# Patient Record
Sex: FEMALE | Race: WHITE | NOT HISPANIC OR LATINO | Employment: OTHER | URBAN - METROPOLITAN AREA
[De-identification: names, ages, dates, MRNs, and addresses within clinical notes are randomized per-mention and may not be internally consistent; named-entity substitution may affect disease eponyms.]

---

## 2020-11-30 ENCOUNTER — HOSPITAL ENCOUNTER (EMERGENCY)
Facility: HOSPITAL | Age: 85
Discharge: HOME | End: 2020-11-30
Attending: EMERGENCY MEDICINE
Payer: MEDICARE

## 2020-11-30 VITALS
BODY MASS INDEX: 28.47 KG/M2 | HEIGHT: 60 IN | OXYGEN SATURATION: 97 % | DIASTOLIC BLOOD PRESSURE: 88 MMHG | TEMPERATURE: 98 F | WEIGHT: 145 LBS | RESPIRATION RATE: 20 BRPM | HEART RATE: 98 BPM | SYSTOLIC BLOOD PRESSURE: 157 MMHG

## 2020-11-30 DIAGNOSIS — M54.2 CHRONIC NECK PAIN: Primary | ICD-10-CM

## 2020-11-30 DIAGNOSIS — G89.29 CHRONIC NECK PAIN: Primary | ICD-10-CM

## 2020-11-30 DIAGNOSIS — S16.1XXA STRAIN OF NECK MUSCLE, INITIAL ENCOUNTER: ICD-10-CM

## 2020-11-30 PROCEDURE — 99284 EMERGENCY DEPT VISIT MOD MDM: CPT

## 2020-11-30 RX ORDER — KETOROLAC TROMETHAMINE 10 MG/1
10 TABLET, FILM COATED ORAL EVERY 8 HOURS PRN
Qty: 20 TABLET | Refills: 0 | Status: ON HOLD | OUTPATIENT
Start: 2020-11-30 | End: 2020-12-14 | Stop reason: HOSPADM

## 2020-11-30 RX ORDER — METHOCARBAMOL 500 MG/1
500 TABLET, FILM COATED ORAL 3 TIMES DAILY PRN
Qty: 20 TABLET | Refills: 0 | Status: ON HOLD | OUTPATIENT
Start: 2020-11-30 | End: 2020-12-14 | Stop reason: HOSPADM

## 2020-11-30 NOTE — ED PROVIDER NOTES
Encounter Date: 11/30/2020       History     Chief Complaint   Patient presents with    Neck Pain     Patient complaining of neck pain x6 months.     84-year-old female presents to ER for concerns of intermittent right neck pain x6 months; pain worsens in the morning upon awakening & exacerbates with direct contact, denies significant alleviating factors, OTC medications taken with minimal improvement, symptoms waxing/waning since onset described as moderate currently    Denies:  fever, headache, dizziness, syncope, vision changes, painful/difficult swallowing, chest pain, shortness breath, cough, abdominal pain, nausea/vomiting/diarrhea, hematuria/dysuria    No previous evaluation has been performed nor has PCP been contacted for today's concerns    Past medical/surgical history, allergies & current medications reviewed with patient    Known SARS-CoV2 exposure:  No  Room:  PIT    The history is provided by the patient. No  was used.     Review of patient's allergies indicates:  No Known Allergies  Past Medical History:   Diagnosis Date    Overactive bladder      History reviewed. No pertinent surgical history.  History reviewed. No pertinent family history.  Social History     Tobacco Use    Smoking status: Never Smoker   Substance Use Topics    Alcohol use: Never     Frequency: Never    Drug use: Never     Review of Systems   Constitutional: Negative for fever.   HENT: Negative for sore throat.    Respiratory: Negative for cough and shortness of breath.    Cardiovascular: Negative for chest pain.   Gastrointestinal: Negative for abdominal pain and nausea.   Genitourinary: Negative for dysuria.   Musculoskeletal: Positive for neck pain. Negative for back pain and gait problem.   Skin: Negative for rash.   Neurological: Negative for weakness and headaches.   Hematological: Does not bruise/bleed easily.   All other systems reviewed and are negative.      Physical Exam     Initial Vitals  [11/30/20 1509]   BP Pulse Resp Temp SpO2   (!) 157/88 98 20 98.1 °F (36.7 °C) 97 %      MAP       --         Physical Exam    Nursing note and vitals reviewed.  Constitutional: She appears well-developed. She does not appear ill. No distress.   AF, VSS   HENT:   Head: Normocephalic and atraumatic.   Right Ear: External ear normal.   Left Ear: External ear normal.   Nose: Nose normal.   Eyes: Lids are normal.   Neck: Trachea normal. Neck supple. No thyromegaly present. Muscular tenderness (moderate & reproducible to right paraspinal muscles, exam is unimpressive otherwise) present.   Patient is able to turn her head left and right 45° without pain, denies pain with palpation of C-spine, no crepitus or step-offs appreciated   Cardiovascular: Normal rate.   Pulses:       Carotid pulses are 2+ on the right side and 2+ on the left side.  Pulmonary/Chest: Effort normal and breath sounds normal. No respiratory distress.   Abdominal: She exhibits no distension.   Lymphadenopathy:     She has no cervical adenopathy.   Neurological: She is alert.   Skin: No rash noted.   Psychiatric: She has a normal mood and affect.         ED Course   Procedures  Labs Reviewed - No data to display       Imaging Results    None          Medical Decision Making:   ED Management:  Findings, diagnosis & plan of care discussed with patient:  Chronic neck pain, neck strain; will discharge patient with Robaxin Toradol, care instructions given -- patient states she does not currently have a PCP therefore we will refer patient to [facility] for follow-up and to establish new patient-provider relationship    All questions answered, strict return precautions given, patient agrees with plan of care & verbalizes understanding to all instructions, pleasant visit -- vital signs are stable & patient is in no distress at discharge    Disclaimer:  This note was prepared with [employer] voice recognition transcription software.  Garbled syntax, mangled pronouns, and other bizarre constructions may be attributed to that software system.  Should there be any questions do not hesitate to contact me for clarification.                               Clinical Impression:       ICD-10-CM ICD-9-CM   1. Chronic neck pain  M54.2 723.1    G89.29 338.29   2. Strain of neck muscle, initial encounter  S16.1XXA 847.0                          ED Disposition Condition    Discharge Stable        ED Prescriptions     Medication Sig Dispense Start Date End Date Auth. Provider    methocarbamoL (ROBAXIN) 500 MG Tab Take 1 tablet (500 mg total) by mouth 3 (three) times daily as needed (Pain / strain). 20 tablet 11/30/2020  Lb Vang NP    ketorolac (TORADOL) 10 mg tablet Take 1 tablet (10 mg total) by mouth every 8 (eight) hours as needed for Pain. 20 tablet 11/30/2020  Lb Vang NP        Follow-up Information     Follow up With Specialties Details Why Contact Info    [facility]  Go to  When scheduled for follow up and establish patient-provider relationship                                        Lb Vang NP  11/30/20 0399

## 2020-12-09 ENCOUNTER — HOSPITAL ENCOUNTER (INPATIENT)
Facility: HOSPITAL | Age: 85
LOS: 6 days | Discharge: NURSING, REHAB OR LONG-TERM CARE | DRG: 840 | End: 2020-12-15
Attending: INTERNAL MEDICINE | Admitting: INTERNAL MEDICINE
Payer: MEDICARE

## 2020-12-09 DIAGNOSIS — C90.00 MULTIPLE MYELOMA: ICD-10-CM

## 2020-12-09 DIAGNOSIS — I82.409 DVT (DEEP VENOUS THROMBOSIS): ICD-10-CM

## 2020-12-09 DIAGNOSIS — Z12.89 ENCOUNTER FOR SCREENING FOR MALIGNANT NEOPLASM OF OTHER SITES: ICD-10-CM

## 2020-12-09 PROBLEM — N39.0 UTI (URINARY TRACT INFECTION): Status: ACTIVE | Noted: 2020-12-09

## 2020-12-09 PROBLEM — J18.9 PNEUMONIA: Status: ACTIVE | Noted: 2020-12-09

## 2020-12-09 PROBLEM — E83.52 HYPERCALCEMIA: Status: ACTIVE | Noted: 2020-12-09

## 2020-12-09 PROBLEM — G93.41 ENCEPHALOPATHY, METABOLIC: Status: ACTIVE | Noted: 2020-12-09

## 2020-12-09 LAB
ALBUMIN SERPL BCP-MCNC: 2.9 G/DL (ref 3.5–5.2)
ALP SERPL-CCNC: 62 U/L (ref 55–135)
ALT SERPL W/O P-5'-P-CCNC: 15 U/L (ref 10–44)
ANION GAP SERPL CALC-SCNC: 9 MMOL/L (ref 8–16)
AST SERPL-CCNC: 21 U/L (ref 10–40)
BASOPHILS # BLD AUTO: 0.03 K/UL (ref 0–0.2)
BASOPHILS NFR BLD: 0.2 % (ref 0–1.9)
BILIRUB SERPL-MCNC: 0.4 MG/DL (ref 0.1–1)
BUN SERPL-MCNC: 20 MG/DL (ref 8–23)
CALCIUM SERPL-MCNC: 9.6 MG/DL (ref 8.7–10.5)
CHLORIDE SERPL-SCNC: 112 MMOL/L (ref 95–110)
CO2 SERPL-SCNC: 23 MMOL/L (ref 23–29)
CREAT SERPL-MCNC: 1 MG/DL (ref 0.5–1.4)
DIFFERENTIAL METHOD: ABNORMAL
EOSINOPHIL # BLD AUTO: 0.1 K/UL (ref 0–0.5)
EOSINOPHIL NFR BLD: 1 % (ref 0–8)
ERYTHROCYTE [DISTWIDTH] IN BLOOD BY AUTOMATED COUNT: 13.2 % (ref 11.5–14.5)
EST. GFR  (AFRICAN AMERICAN): >60 ML/MIN/1.73 M^2
EST. GFR  (NON AFRICAN AMERICAN): 52.2 ML/MIN/1.73 M^2
GLUCOSE SERPL-MCNC: 88 MG/DL (ref 70–110)
HCT VFR BLD AUTO: 31.9 % (ref 37–48.5)
HGB BLD-MCNC: 10.2 G/DL (ref 12–16)
IMM GRANULOCYTES # BLD AUTO: 0.18 K/UL (ref 0–0.04)
IMM GRANULOCYTES NFR BLD AUTO: 1.4 % (ref 0–0.5)
LYMPHOCYTES # BLD AUTO: 0.9 K/UL (ref 1–4.8)
LYMPHOCYTES NFR BLD: 7.5 % (ref 18–48)
MAGNESIUM SERPL-MCNC: 1.6 MG/DL (ref 1.6–2.6)
MCH RBC QN AUTO: 32.7 PG (ref 27–31)
MCHC RBC AUTO-ENTMCNC: 32 G/DL (ref 32–36)
MCV RBC AUTO: 102 FL (ref 82–98)
MONOCYTES # BLD AUTO: 0.9 K/UL (ref 0.3–1)
MONOCYTES NFR BLD: 7.5 % (ref 4–15)
NEUTROPHILS # BLD AUTO: 10.3 K/UL (ref 1.8–7.7)
NEUTROPHILS NFR BLD: 82.4 % (ref 38–73)
NRBC BLD-RTO: 0 /100 WBC
PLATELET # BLD AUTO: 320 K/UL (ref 150–350)
PMV BLD AUTO: 10.4 FL (ref 9.2–12.9)
POTASSIUM SERPL-SCNC: 3.8 MMOL/L (ref 3.5–5.1)
PROT SERPL-MCNC: 5.5 G/DL (ref 6–8.4)
RBC # BLD AUTO: 3.12 M/UL (ref 4–5.4)
SODIUM SERPL-SCNC: 144 MMOL/L (ref 136–145)
TSH SERPL DL<=0.005 MIU/L-ACNC: 2.77 UIU/ML (ref 0.4–4)
WBC # BLD AUTO: 12.52 K/UL (ref 3.9–12.7)

## 2020-12-09 PROCEDURE — 80053 COMPREHEN METABOLIC PANEL: CPT

## 2020-12-09 PROCEDURE — 20600001 HC STEP DOWN PRIVATE ROOM

## 2020-12-09 PROCEDURE — 36415 COLL VENOUS BLD VENIPUNCTURE: CPT

## 2020-12-09 PROCEDURE — 84443 ASSAY THYROID STIM HORMONE: CPT

## 2020-12-09 PROCEDURE — 85025 COMPLETE CBC W/AUTO DIFF WBC: CPT

## 2020-12-09 PROCEDURE — 83735 ASSAY OF MAGNESIUM: CPT

## 2020-12-09 RX ORDER — ASPIRIN 81 MG/1
81 TABLET ORAL DAILY
Status: DISCONTINUED | OUTPATIENT
Start: 2020-12-10 | End: 2020-12-15 | Stop reason: HOSPADM

## 2020-12-09 RX ORDER — TALC
6 POWDER (GRAM) TOPICAL NIGHTLY PRN
Status: DISCONTINUED | OUTPATIENT
Start: 2020-12-09 | End: 2020-12-15 | Stop reason: HOSPADM

## 2020-12-09 RX ORDER — IPRATROPIUM BROMIDE AND ALBUTEROL SULFATE 2.5; .5 MG/3ML; MG/3ML
3 SOLUTION RESPIRATORY (INHALATION)
Status: DISCONTINUED | OUTPATIENT
Start: 2020-12-10 | End: 2020-12-11

## 2020-12-09 RX ORDER — ENOXAPARIN SODIUM 100 MG/ML
40 INJECTION SUBCUTANEOUS EVERY 24 HOURS
Status: DISCONTINUED | OUTPATIENT
Start: 2020-12-10 | End: 2020-12-13

## 2020-12-09 RX ORDER — ONDANSETRON 8 MG/1
8 TABLET, ORALLY DISINTEGRATING ORAL EVERY 8 HOURS PRN
Status: DISCONTINUED | OUTPATIENT
Start: 2020-12-09 | End: 2020-12-15 | Stop reason: HOSPADM

## 2020-12-09 RX ORDER — MUPIROCIN 20 MG/G
OINTMENT TOPICAL 2 TIMES DAILY
Status: COMPLETED | OUTPATIENT
Start: 2020-12-10 | End: 2020-12-14

## 2020-12-09 RX ORDER — CEFTRIAXONE 1 G/1
1 INJECTION, POWDER, FOR SOLUTION INTRAMUSCULAR; INTRAVENOUS
Status: DISCONTINUED | OUTPATIENT
Start: 2020-12-10 | End: 2020-12-10

## 2020-12-09 RX ORDER — SODIUM CHLORIDE 0.9 % (FLUSH) 0.9 %
10 SYRINGE (ML) INJECTION
Status: DISCONTINUED | OUTPATIENT
Start: 2020-12-09 | End: 2020-12-15 | Stop reason: HOSPADM

## 2020-12-09 NOTE — PLAN OF CARE
Outside Transfer Note / Regional Referral Center    Date of acceptance: 12/09/2020    Referring facility/ provider: [facility]    Accepting Physician: Oncology / Dr Lyon     Reason for transfer: Higher level of care /  MM vs Metastatic Cancer    Report from referring provider:    84-year-old woman with a PMH  HTN was admitted to [facility] on 12/07 with confusion and not able to walk.  Also, right shoulder pain.  Prior to this she was working as a  and was driving a car.  On presentation CXR was pos for pneumonia.  U/A was also pos.  Urine cultures later reported pos for E coli sensitive to CTX.  Right shoulder x-ray was pos for lytic lesions concerning for multiple myeloma.  Serum calcium was 14.3.  The patient was started on IVF, CTX, calcitonin, and zoledronic acid.  Labs  Ca 14.3 > 10.2, BUN 27 > 17, Cr 1.28 > 1.0, Hb 10.1 WBC 12.6 (85% polys) platelets 329 total protein 7 albumin 3.9 T bili 0.4 ALT 17 AST 16 alk phos 87.  Serum and urine protein electrophoresis and PTH ordered (send out labs).  VS ( 800h) /82 HR 84 R 18 T 36.9° SpO2 97% (2 L).  The patient continues to be mildly confused.    Adalid Dotson MD [facility]  / [facility]   [phone]

## 2020-12-10 LAB
ALBUMIN SERPL BCP-MCNC: 2.7 G/DL (ref 3.5–5.2)
ALBUMIN SERPL ELPH-MCNC: 2.63 G/DL (ref 3.35–5.55)
ALP SERPL-CCNC: 59 U/L (ref 55–135)
ALPHA1 GLOB SERPL ELPH-MCNC: 0.44 G/DL (ref 0.17–0.41)
ALPHA2 GLOB SERPL ELPH-MCNC: 0.86 G/DL (ref 0.43–0.99)
ALT SERPL W/O P-5'-P-CCNC: 14 U/L (ref 10–44)
ANION GAP SERPL CALC-SCNC: 11 MMOL/L (ref 8–16)
AST SERPL-CCNC: 23 U/L (ref 10–40)
B-GLOBULIN SERPL ELPH-MCNC: 0.5 G/DL (ref 0.5–1.1)
B2 MICROGLOB SERPL-MCNC: 3.6 UG/ML (ref 0–2.5)
BACTERIA #/AREA URNS AUTO: ABNORMAL /HPF
BASOPHILS # BLD AUTO: 0.02 K/UL (ref 0–0.2)
BASOPHILS NFR BLD: 0.2 % (ref 0–1.9)
BILIRUB SERPL-MCNC: 0.3 MG/DL (ref 0.1–1)
BILIRUB UR QL STRIP: NEGATIVE
BUN SERPL-MCNC: 20 MG/DL (ref 8–23)
CALCIUM SERPL-MCNC: 8.9 MG/DL (ref 8.7–10.5)
CHLORIDE SERPL-SCNC: 112 MMOL/L (ref 95–110)
CLARITY UR REFRACT.AUTO: ABNORMAL
CO2 SERPL-SCNC: 21 MMOL/L (ref 23–29)
COLOR UR AUTO: YELLOW
CREAT SERPL-MCNC: 1 MG/DL (ref 0.5–1.4)
DIFFERENTIAL METHOD: ABNORMAL
EOSINOPHIL # BLD AUTO: 0.2 K/UL (ref 0–0.5)
EOSINOPHIL NFR BLD: 1.8 % (ref 0–8)
ERYTHROCYTE [DISTWIDTH] IN BLOOD BY AUTOMATED COUNT: 13.2 % (ref 11.5–14.5)
EST. GFR  (AFRICAN AMERICAN): >60 ML/MIN/1.73 M^2
EST. GFR  (NON AFRICAN AMERICAN): 52.2 ML/MIN/1.73 M^2
ESTIMATED AVG GLUCOSE: 114 MG/DL (ref 68–131)
GAMMA GLOB SERPL ELPH-MCNC: 0.47 G/DL (ref 0.67–1.58)
GLUCOSE SERPL-MCNC: 94 MG/DL (ref 70–110)
GLUCOSE UR QL STRIP: NEGATIVE
HBA1C MFR BLD HPLC: 5.6 % (ref 4–5.6)
HCT VFR BLD AUTO: 31.1 % (ref 37–48.5)
HGB BLD-MCNC: 9.8 G/DL (ref 12–16)
HGB UR QL STRIP: ABNORMAL
HYALINE CASTS UR QL AUTO: 0 /LPF
IGA SERPL-MCNC: 34 MG/DL (ref 40–350)
IGG SERPL-MCNC: 389 MG/DL (ref 650–1600)
IGM SERPL-MCNC: 6 MG/DL (ref 50–300)
IMM GRANULOCYTES # BLD AUTO: 0.22 K/UL (ref 0–0.04)
IMM GRANULOCYTES NFR BLD AUTO: 1.8 % (ref 0–0.5)
INTERPRETATION SERPL IFE-IMP: NORMAL
KAPPA LC SER QL IA: 264.28 MG/DL (ref 0.33–1.94)
KAPPA LC/LAMBDA SER IA: 695.5 (ref 0.26–1.65)
KETONES UR QL STRIP: NEGATIVE
LAMBDA LC SER QL IA: 0.38 MG/DL (ref 0.57–2.63)
LDH SERPL L TO P-CCNC: 260 U/L (ref 110–260)
LEUKOCYTE ESTERASE UR QL STRIP: ABNORMAL
LYMPHOCYTES # BLD AUTO: 1 K/UL (ref 1–4.8)
LYMPHOCYTES NFR BLD: 8 % (ref 18–48)
MAGNESIUM SERPL-MCNC: 1.6 MG/DL (ref 1.6–2.6)
MCH RBC QN AUTO: 31.5 PG (ref 27–31)
MCHC RBC AUTO-ENTMCNC: 31.5 G/DL (ref 32–36)
MCV RBC AUTO: 100 FL (ref 82–98)
MICROSCOPIC COMMENT: ABNORMAL
MONOCYTES # BLD AUTO: 0.9 K/UL (ref 0.3–1)
MONOCYTES NFR BLD: 7.2 % (ref 4–15)
NEUTROPHILS # BLD AUTO: 10.2 K/UL (ref 1.8–7.7)
NEUTROPHILS NFR BLD: 81 % (ref 38–73)
NITRITE UR QL STRIP: NEGATIVE
NRBC BLD-RTO: 0 /100 WBC
PATHOLOGIST INTERPRETATION IFE: NORMAL
PATHOLOGIST INTERPRETATION SPE: NORMAL
PH UR STRIP: 6 [PH] (ref 5–8)
PHOSPHATE SERPL-MCNC: 1.9 MG/DL (ref 2.7–4.5)
PHOSPHATE SERPL-MCNC: 1.9 MG/DL (ref 2.7–4.5)
PLATELET # BLD AUTO: 302 K/UL (ref 150–350)
PMV BLD AUTO: 10.3 FL (ref 9.2–12.9)
POTASSIUM SERPL-SCNC: 3.4 MMOL/L (ref 3.5–5.1)
PROT SERPL-MCNC: 4.9 G/DL (ref 6–8.4)
PROT SERPL-MCNC: 5 G/DL (ref 6–8.4)
PROT UR QL STRIP: ABNORMAL
RBC # BLD AUTO: 3.11 M/UL (ref 4–5.4)
RBC #/AREA URNS AUTO: 3 /HPF (ref 0–4)
SARS-COV-2 RDRP RESP QL NAA+PROBE: NEGATIVE
SODIUM SERPL-SCNC: 144 MMOL/L (ref 136–145)
SP GR UR STRIP: 1.01 (ref 1–1.03)
SQUAMOUS #/AREA URNS AUTO: 1 /HPF
URATE SERPL-MCNC: 5.9 MG/DL (ref 2.4–5.7)
URN SPEC COLLECT METH UR: ABNORMAL
WBC # BLD AUTO: 12.55 K/UL (ref 3.9–12.7)
WBC #/AREA URNS AUTO: 5 /HPF (ref 0–5)

## 2020-12-10 PROCEDURE — 63600175 PHARM REV CODE 636 W HCPCS: Performed by: STUDENT IN AN ORGANIZED HEALTH CARE EDUCATION/TRAINING PROGRAM

## 2020-12-10 PROCEDURE — 83036 HEMOGLOBIN GLYCOSYLATED A1C: CPT

## 2020-12-10 PROCEDURE — 25000003 PHARM REV CODE 250: Performed by: INTERNAL MEDICINE

## 2020-12-10 PROCEDURE — 84165 PROTEIN E-PHORESIS SERUM: CPT

## 2020-12-10 PROCEDURE — 81001 URINALYSIS AUTO W/SCOPE: CPT

## 2020-12-10 PROCEDURE — 97535 SELF CARE MNGMENT TRAINING: CPT

## 2020-12-10 PROCEDURE — 99223 PR INITIAL HOSPITAL CARE,LEVL III: ICD-10-PCS | Mod: AI,,, | Performed by: INTERNAL MEDICINE

## 2020-12-10 PROCEDURE — 25000003 PHARM REV CODE 250: Performed by: STUDENT IN AN ORGANIZED HEALTH CARE EDUCATION/TRAINING PROGRAM

## 2020-12-10 PROCEDURE — 84100 ASSAY OF PHOSPHORUS: CPT

## 2020-12-10 PROCEDURE — 86334 IMMUNOFIX E-PHORESIS SERUM: CPT | Mod: 26,,, | Performed by: PATHOLOGY

## 2020-12-10 PROCEDURE — 86334 IMMUNOFIX E-PHORESIS SERUM: CPT

## 2020-12-10 PROCEDURE — 25500020 PHARM REV CODE 255: Performed by: INTERNAL MEDICINE

## 2020-12-10 PROCEDURE — 36415 COLL VENOUS BLD VENIPUNCTURE: CPT

## 2020-12-10 PROCEDURE — 83735 ASSAY OF MAGNESIUM: CPT

## 2020-12-10 PROCEDURE — 94761 N-INVAS EAR/PLS OXIMETRY MLT: CPT

## 2020-12-10 PROCEDURE — 25000242 PHARM REV CODE 250 ALT 637 W/ HCPCS: Performed by: STUDENT IN AN ORGANIZED HEALTH CARE EDUCATION/TRAINING PROGRAM

## 2020-12-10 PROCEDURE — 80053 COMPREHEN METABOLIC PANEL: CPT

## 2020-12-10 PROCEDURE — 84165 PATHOLOGIST INTERPRETATION SPE: ICD-10-PCS | Mod: 26,,, | Performed by: PATHOLOGY

## 2020-12-10 PROCEDURE — 85025 COMPLETE CBC W/AUTO DIFF WBC: CPT

## 2020-12-10 PROCEDURE — 84165 PROTEIN E-PHORESIS SERUM: CPT | Mod: 26,,, | Performed by: PATHOLOGY

## 2020-12-10 PROCEDURE — 84550 ASSAY OF BLOOD/URIC ACID: CPT

## 2020-12-10 PROCEDURE — 82784 ASSAY IGA/IGD/IGG/IGM EACH: CPT | Mod: 59

## 2020-12-10 PROCEDURE — 99223 1ST HOSP IP/OBS HIGH 75: CPT | Mod: AI,,, | Performed by: INTERNAL MEDICINE

## 2020-12-10 PROCEDURE — 99900035 HC TECH TIME PER 15 MIN (STAT)

## 2020-12-10 PROCEDURE — 83520 IMMUNOASSAY QUANT NOS NONAB: CPT | Mod: 59

## 2020-12-10 PROCEDURE — 27000646 HC AEROBIKA DEVICE

## 2020-12-10 PROCEDURE — 86334 PATHOLOGIST INTERPRETATION IFE: ICD-10-PCS | Mod: 26,,, | Performed by: PATHOLOGY

## 2020-12-10 PROCEDURE — 94664 DEMO&/EVAL PT USE INHALER: CPT

## 2020-12-10 PROCEDURE — 82232 ASSAY OF BETA-2 PROTEIN: CPT

## 2020-12-10 PROCEDURE — 20600001 HC STEP DOWN PRIVATE ROOM

## 2020-12-10 PROCEDURE — U0002 COVID-19 LAB TEST NON-CDC: HCPCS

## 2020-12-10 PROCEDURE — 83615 LACTATE (LD) (LDH) ENZYME: CPT

## 2020-12-10 PROCEDURE — 94640 AIRWAY INHALATION TREATMENT: CPT

## 2020-12-10 PROCEDURE — 97165 OT EVAL LOW COMPLEX 30 MIN: CPT

## 2020-12-10 RX ORDER — SODIUM,POTASSIUM PHOSPHATES 280-250MG
2 POWDER IN PACKET (EA) ORAL ONCE
Status: COMPLETED | OUTPATIENT
Start: 2020-12-10 | End: 2020-12-10

## 2020-12-10 RX ORDER — MAGNESIUM SULFATE HEPTAHYDRATE 40 MG/ML
2 INJECTION, SOLUTION INTRAVENOUS ONCE
Status: COMPLETED | OUTPATIENT
Start: 2020-12-10 | End: 2020-12-10

## 2020-12-10 RX ADMIN — CEFTRIAXONE SODIUM 1 G: 1 INJECTION, POWDER, FOR SOLUTION INTRAMUSCULAR; INTRAVENOUS at 04:12

## 2020-12-10 RX ADMIN — ASPIRIN 81 MG: 81 TABLET, COATED ORAL at 09:12

## 2020-12-10 RX ADMIN — IOHEXOL 75 ML: 350 INJECTION, SOLUTION INTRAVENOUS at 09:12

## 2020-12-10 RX ADMIN — POTASSIUM & SODIUM PHOSPHATES POWDER PACK 280-160-250 MG 2 PACKET: 280-160-250 PACK at 03:12

## 2020-12-10 RX ADMIN — IPRATROPIUM BROMIDE AND ALBUTEROL SULFATE 3 ML: .5; 2.5 SOLUTION RESPIRATORY (INHALATION) at 08:12

## 2020-12-10 RX ADMIN — MUPIROCIN: 20 OINTMENT TOPICAL at 09:12

## 2020-12-10 RX ADMIN — ENOXAPARIN SODIUM 40 MG: 40 INJECTION SUBCUTANEOUS at 04:12

## 2020-12-10 RX ADMIN — IPRATROPIUM BROMIDE AND ALBUTEROL SULFATE 3 ML: .5; 2.5 SOLUTION RESPIRATORY (INHALATION) at 11:12

## 2020-12-10 RX ADMIN — AZITHROMYCIN MONOHYDRATE 500 MG: 500 INJECTION, POWDER, LYOPHILIZED, FOR SOLUTION INTRAVENOUS at 12:12

## 2020-12-10 RX ADMIN — MAGNESIUM SULFATE IN WATER 2 G: 40 INJECTION, SOLUTION INTRAVENOUS at 04:12

## 2020-12-10 RX ADMIN — POTASSIUM & SODIUM PHOSPHATES POWDER PACK 280-160-250 MG 2 PACKET: 280-160-250 PACK at 11:12

## 2020-12-10 RX ADMIN — MUPIROCIN: 20 OINTMENT TOPICAL at 08:12

## 2020-12-10 NOTE — ASSESSMENT & PLAN NOTE
-- CT head at OSH shows no acute pathology  -- Hypercalcemia and infection likely contributing    Plan  -- Manage hypercalcemia with IVF  -- TSH  -- Treat underlying infection  -- Delirium precautions

## 2020-12-10 NOTE — ASSESSMENT & PLAN NOTE
Community Acquired Pneumonia    -- Chest Xray: left basilar atelectasis with developing consolidation    Plan:  -- IV CTX + Azithromycin (today is day 3)  Will transition as IV antibiotics to oral Levaquin every other day starting 12/11/2020.  -- Oxygen prn for spo2 <90  -- Duo-nebs q6 hours  -- Acapella and Incentive Spirometer

## 2020-12-10 NOTE — H&P
[facility]  Hematology/Oncology  H&P    Patient Name: Mickie Pan  MRN: [id number]  Admission Date: 12/9/2020  Code Status: Full Code   Attending Provider: Roger Lyon MD  Primary Care Physician: Primary Doctor No  Principal Problem:Encephalopathy, metabolic    Subjective:     HPI: Mickie Pan is an 84-year-old female with a PMHx of overactive bladder who presents to [facility] as a transfer from [facility] in [place], [place] for further evaluation for suspected multiple myeloma as well as altered mental status + UTI/Pneumonia.     Pt has been having intermittent right neck pain for the past 6 months; pain worsens in the morning upon awakening & exacerbates with direct contact. Pt also having bilateral arm pain.     Oncology Treatment Plan:   [No treatment plan]    Medications:  Continuous Infusions:  Scheduled Meds:   [START ON 12/10/2020] albuterol-ipratropium  3 mL Nebulization Q6H WAKE    [START ON 12/10/2020] aspirin  81 mg Oral Daily    [START ON 12/10/2020] azithromycin  500 mg Intravenous Q24H    [START ON 12/10/2020] cefTRIAXone (ROCEPHIN) IVPB  1 g Intravenous Q24H    [START ON 12/10/2020] enoxaparin  40 mg Subcutaneous Q24H    [START ON 12/10/2020] mupirocin   Nasal BID     PRN Meds:melatonin, ondansetron, sodium chloride 0.9%     Review of patient's allergies indicates:  No Known Allergies     Past Medical History:   Diagnosis Date    Overactive bladder      No past surgical history on file.  Family History     None        Tobacco Use    Smoking status: Never Smoker   Substance and Sexual Activity    Alcohol use: Never     Frequency: Never    Drug use: Never    Sexual activity: Not Currently       Review of Systems   Unable to perform ROS: Mental status change     Objective:     Vital Signs (Most Recent):  Temp: 98.2 °F (36.8 °C) (12/09/20 2213)  Pulse: 73 (12/09/20 2213)  Resp: 20 (12/09/20 2213)  BP: 130/75 (12/09/20 2213)  SpO2: 99 % (12/09/20 2213) Vital  Signs (24h Range):  Temp:  [97.9 °F (36.6 °C)-98.5 °F (36.9 °C)] 98.2 °F (36.8 °C)  Pulse:  [73-84] 73  Resp:  [18-20] 20  SpO2:  [96 %-99 %] 99 %  BP: (130-163)/(75-92) 130/75        There is no height or weight on file to calculate BMI.  There is no height or weight on file to calculate BSA.    No intake or output data in the 24 hours ending 12/09/20 9531    Physical Exam  Constitutional:       General: She is not in acute distress.     Appearance: She is not ill-appearing.   HENT:      Head: Normocephalic.      Mouth/Throat:      Mouth: Mucous membranes are moist.      Pharynx: Oropharynx is clear.   Eyes:      Pupils: Pupils are equal, round, and reactive to light.      Comments: r eye watery   Neck:      Musculoskeletal: No neck rigidity or muscular tenderness.   Cardiovascular:      Rate and Rhythm: Normal rate.      Heart sounds: Normal heart sounds.   Pulmonary:      Effort: Pulmonary effort is normal.      Breath sounds: Normal breath sounds.   Abdominal:      General: Abdomen is flat. There is no distension.      Palpations: Abdomen is soft.      Tenderness: There is no abdominal tenderness.   Musculoskeletal:         General: No swelling or tenderness.      Comments: Pain limited ROM in R and L shoulder (R>L)   Skin:     General: Skin is warm and dry.      Capillary Refill: Capillary refill takes less than 2 seconds.   Neurological:      General: No focal deficit present.      Mental Status: She is alert. She is disoriented.         Significant Labs:   All pertinent labs from the last 24 hours have been reviewed.    Diagnostic Results:  I have reviewed all pertinent imaging results/findings within the past 24 hours.    Assessment/Plan:     * Encephalopathy, metabolic  -- CT head at OSH shows no acute pathology  -- Hypercalcemia and infection likely contributing    Plan  -- Manage hypercalcemia with IVF  -- TSH  -- Treat underlying infection  -- Delirium precautions    Multiple myeloma  -- Right shoulder  xray: no evidence of fx. Small cortical lucencies suspicious for multiple myeloma.   -- Serum and urine protein electrophoresis collected but are send out labs.     Plan:  -- SPEP  -- Immunofixation  -- IgG, IgA, IgM quant  -- Free light chains  -- LDH  -- Beta 2 microglobulin  -- Uric Acid  -- F/U OSH labs    UTI (urinary tract infection)  -- Urine cx growing E coli at OSH    Plan  -- IV CTX (today is day 3)  -- Repeat UA    Pneumonia  Community Acquired Pneumonia    -- Chest Xray: left basilar atelectasis with developing consolidation    Plan:  -- Repeat CXR  -- Continue IV CTX + Azithromycin (today is day 3)  -- Oxygen prn for spo2 <90  -- Duo-nebs q6 hours  -- Acapella and Incentive Spirometer      Hypercalcemia  -- Elevated Ca of 14.4 on CMP  -- Rreated with IVF's, calcitonin, zometa at OSH  -- Patient is awake but confused following simple commands.     Plan  -- Serial chemistries  -- IVF's for hypercalcemia       Matt Gregg MD  Hematology/Oncology  [facility]

## 2020-12-10 NOTE — ASSESSMENT & PLAN NOTE
-- Right shoulder xray: no evidence of fx. Small cortical lucencies suspicious for multiple myeloma.   -- Serum and urine protein electrophoresis collected but are send out labs.     Plan:  -- SPEP  -- Immunofixation  -- IgG, IgA, IgM quant  -- Free light chains  -- LDH  -- Beta 2 microglobulin  -- Uric Acid  -- F/U OSH labs

## 2020-12-10 NOTE — HOSPITAL COURSE
Admit to medicine Oncology on 12/09/2020.  Collateral information obtained from son reports patient has had altered mental status for the last week, reports prior to current episode patient was able to perform all activities of daily living on her own.  CT chest abdomen pelvis:  Small pleural effusion, multilevel lucencies in vertebrae, most pronounced in T7, concerning for metastatic disease.  Kappa free light chain:  264.28, lambda free light chain:  0.38, kappa/lambda the 695.50    12/11/2020 today patient is more alert and oriented x3.  We have discussed the findings with her concerning for multiple myeloma.  She wanted to establish her care here.  Bone marrow biopsy on 12/11. Started on lovenox and switched to apixiban for DVT treatment.  Discharged to SNF on 12/15.

## 2020-12-10 NOTE — PROGRESS NOTES
Admit Assessment    Patient Identification  Mickie Pan   :  1935  Admit Date:  2020  Attending Provider:  Roger Lyon MD              Referral:   Pt was admitted to [facility] with a diagnosis of Encephalopathy, metabolic, and was admitted this hospital stay due to Multiple myeloma [C90.00].       is involved.  Ms. Pan was referred to the Social Work Department via routine referal.  Patient presents as a 84 y.o. year old female.    Persons interviewed: Patient bedside and patient's son via telephone with patient's permission.    Living Situation:      Resides at [address]  [place] [address] [place] [address], phone: [phone] (home).  Patient lives alone and her son, Tony lives on the same property in his own home.      (RETIRED) Functional Status Prior  Ambulation Prior: 0-->independent  Transferrin-->independent  Toiletin-->independent    Tony reported that prior to this hospitalization pt was working 40 hours per week and driving herself to work.  However in the 7 days prior to this hospitalization patient declined drastically.  He said for 7 days she slept in the same chair and did not get up accept to use the restroom, however about two days prior to hospitalization she stopped getting up altogether.  He acknowledged a slow steady decline over the last couple of months stating she was in more and more pain in her lower extremities and her wrist.      Current or Past Agencies and Description of Services/Supplies    DME N/A  Equipment Currently Used at Home: none    Home Health N/A    IV Infusion N/A    Nutrition: Oral    Outpatient Pharmacy:     [id number] - [place], [place] - [address] [facility]  [address] [place] [address]  Phone: [phone] Fax: [phone]    [id number] - [place], [place] - [address] AT [facility]  [address]  [address]  [facility] [address]  Phone: [phone] Fax: [phone]      Patient Preference of agencies include:  None noted at this time.    Patient/Caregiver informed of right to choose providers or agencies.  Patient provides permission to release any necessary information to [facility] and to [facility] agencies as needed to facilitate patient care, treatment planning, and patient discharge planning.  Written and verbal resources provided.      Coping:  Tony said his mother does not readily seek medical attention and has a mistrust of the medical system.  He said he had to force her to go to the hospital by threatening to call an ambulance.  Patient is currently mentally altered so it is difficult to assess her level of coping.      Adjustment to Diagnosis and Treatment:  Patient's son expressed concerns about how she will adjust to illness.  He said she is fiercly independent and does not readily seek medical treatment.     Emotional/Behavioral/Cognitive Issues: Patient thinks she has been in the hospital for over one month, but it has only been a few days.  She also thought she was at "[facility].  She spoke to her son on the phone while SW was in her room and she appeared very relieved to speak with him.       History/Current Symptoms of Anxiety/Depression: None noted  History/Current Substance Use:   Social History     Tobacco Use    Smoking status: Never Smoker   Substance and Sexual Activity    Alcohol use: Never     Frequency: Never    Drug use: Never    Sexual activity: Not Currently       Indications of Abuse/Neglect: No  Abuse Screen: Not indicated  Feels Unsafe at Home or Work/School: No    Financial:  Payor/Plan Subscr  Sex Relation Sub. Ins. ID Effective Group Num   1. HUMA[employer] MANAGE* PRIMITIVOZONIAHUGH* 1935 Female  [id number] 1/1/18 [id number]                                   [address] [address]     Other identified concerns/needs: None noted    Plan:To be  determined    Interventions/Referrals:  provided name and contact information to Tony, patient's son and will assist with discharge planning and any other needs.    Patient/caregiver engaged in treatment planning process.     providing psychosocial and supportive counseling, resources, education, assistance and discharge planning as appropriate.  Patient/caregiver state understanding of  available resources,  following, remains available.

## 2020-12-10 NOTE — PT/OT/SLP EVAL
Occupational Therapy   Evaluation    Name: Mickie Pan  MRN: [id number]  Admitting Diagnosis:  Encephalopathy, metabolic      Recommendations:     Discharge Recommendations: home with home health  Discharge Equipment Recommendations:     Barriers to discharge:  Decreased caregiver support    Assessment:     Mickie Pan is a 84 y.o. female with a medical diagnosis of Encephalopathy, metabolic.  She presents supine and requesting assistance with toileting.  Pt with CGA for rolling and positioning for bed pan as pt refused out of bed due to fatigue. Pt with noted decreased cognitive performance and questionable history.  Will continue to assess to ensure pt safety and max functional return. . Performance deficits affecting function: weakness, impaired sensation, impaired cognition, impaired functional mobilty, impaired self care skills.      Rehab Prognosis: Good; patient would benefit from acute skilled OT services to address these deficits and reach maximum level of function.       Plan:     Patient to be seen 3 x/week to address the above listed problems via self-care/home management, therapeutic activities, therapeutic exercises  · Plan of Care Expires: 01/10/21  · Plan of Care Reviewed with: patient    Subjective     Chief Complaint: fatigue   Patient/Family Comments/goals: return to home and work     Occupational Profile:  Living Environment: Pt lives in 1 story house with 4-5 steps to enter   Previous level of function: Pt reports indep with all self care and home performance prior to admission.   Roles and Routines: Pt states she works at [employer]   Equipment Used at Home:  none  Assistance upon Discharge: Son lives on property and able to assist per pt    Pain/Comfort:  · Pain Rating 1: 0/10    Patients cultural, spiritual, Yarsanism conflicts given the current situation: no    Objective:     Communicated with: RN prior to session.  Patient found supine with peripheral IV upon OT  entry to room.    General Precautions: Standard, fall   Orthopedic Precautions:N/A   Braces: N/A     Occupational Performance:    Bed Mobility:    · Patient completed Rolling/Turning to Left with  contact guard assistance  · Patient completed Rolling/Turning to Right with contact guard assistance  · Patient completed Scooting/Bridging with minimum assistance      Activities of Daily Living:  · Feeding:  independence    · Grooming: stand by assistance    · Toileting: moderate assistance      Cognitive/Visual Perceptual:  Cognitive/Psychosocial Skills:     -       Oriented to: Person   -       Follows Commands/attention:Follows one-step commands  -       Communication: clear/fluent  -       Memory: questionable history   -       Safety awareness/insight to disability: impaired   -       Mood/Affect/Coping skills/emotional control: Appropriate to situation    Physical Exam:  Upper Extremity Range of Motion:     -       Right Upper Extremity: WFL  -       Left Upper Extremity: WFL  Upper Extremity Strength:    -       Right Upper Extremity: WFL  -       Left Upper Extremity: WFL    AMPAC 6 Click ADL:  AMPAC Total Score: 17    Treatment & Education:  Evaluation complete and goals set.  Cont with POC  Pt educated on safety, role of OT, importance of increased participation in self care for gains , expectations for participation, expectations for gains, POC, energy conservation, caregiver strain. White board updated.   - ADL training   Education:    Patient left supine with all lines intact    GOALS:   Multidisciplinary Problems     Occupational Therapy Goals        Problem: Occupational Therapy Goal    Goal Priority Disciplines Outcome Interventions   Occupational Therapy Goal     OT, PT/OT Ongoing, Progressing    Description: Goals to be met by: 12/31     Patient will increase functional independence with ADLs by performing:    UE Dressing with Sharkey.  LE Dressing with Sharkey.  Grooming while standing with  Set-up Assistance.  Toileting from toilet with [place] for hygiene and clothing management.                      History:     Past Medical History:   Diagnosis Date    Overactive bladder        No past surgical history on file.    Time Tracking:     OT Date of Treatment: 12/10/20  OT Start Time: 1030  OT Stop Time: 1050  OT Total Time (min): 20 min    Billable Minutes:Evaluation 10  Self Care/Home Management 10    Aranza Shabazz, OT  12/10/2020

## 2020-12-10 NOTE — PT/OT/SLP PROGRESS
Physical Therapy      Patient Name:  Mickie Pan   MRN:  [id number]    Patient not seen today secondary to (MD team in room and rule-out for Covid-19). Will follow-up tomorrow.    Lb Purdy, PT   12/10/2020

## 2020-12-10 NOTE — SUBJECTIVE & OBJECTIVE
Oncology Treatment Plan:   [No treatment plan]    Medications:  Continuous Infusions:  Scheduled Meds:   [START ON 12/10/2020] albuterol-ipratropium  3 mL Nebulization Q6H WAKE    [START ON 12/10/2020] aspirin  81 mg Oral Daily    [START ON 12/10/2020] azithromycin  500 mg Intravenous Q24H    [START ON 12/10/2020] cefTRIAXone (ROCEPHIN) IVPB  1 g Intravenous Q24H    [START ON 12/10/2020] enoxaparin  40 mg Subcutaneous Q24H    [START ON 12/10/2020] mupirocin   Nasal BID     PRN Meds:melatonin, ondansetron, sodium chloride 0.9%     Review of patient's allergies indicates:  No Known Allergies     Past Medical History:   Diagnosis Date    Overactive bladder      No past surgical history on file.  Family History     None        Tobacco Use    Smoking status: Never Smoker   Substance and Sexual Activity    Alcohol use: Never     Frequency: Never    Drug use: Never    Sexual activity: Not Currently       Review of Systems   Unable to perform ROS: Mental status change     Objective:     Vital Signs (Most Recent):  Temp: 98.2 °F (36.8 °C) (12/09/20 2213)  Pulse: 73 (12/09/20 2213)  Resp: 20 (12/09/20 2213)  BP: 130/75 (12/09/20 2213)  SpO2: 99 % (12/09/20 2213) Vital Signs (24h Range):  Temp:  [97.9 °F (36.6 °C)-98.5 °F (36.9 °C)] 98.2 °F (36.8 °C)  Pulse:  [73-84] 73  Resp:  [18-20] 20  SpO2:  [96 %-99 %] 99 %  BP: (130-163)/(75-92) 130/75        There is no height or weight on file to calculate BMI.  There is no height or weight on file to calculate BSA.    No intake or output data in the 24 hours ending 12/09/20 2341    Physical Exam  Constitutional:       General: She is not in acute distress.     Appearance: She is not ill-appearing.   HENT:      Head: Normocephalic.      Mouth/Throat:      Mouth: Mucous membranes are moist.      Pharynx: Oropharynx is clear.   Eyes:      Pupils: Pupils are equal, round, and reactive to light.      Comments: r eye watery   Neck:      Musculoskeletal: No neck rigidity or  muscular tenderness.   Cardiovascular:      Rate and Rhythm: Normal rate.      Heart sounds: Normal heart sounds.   Pulmonary:      Effort: Pulmonary effort is normal.      Breath sounds: Normal breath sounds.   Abdominal:      General: Abdomen is flat. There is no distension.      Palpations: Abdomen is soft.      Tenderness: There is no abdominal tenderness.   Musculoskeletal:         General: No swelling or tenderness.      Comments: Pain limited ROM in R and L shoulder (R>L)   Skin:     General: Skin is warm and dry.      Capillary Refill: Capillary refill takes less than 2 seconds.   Neurological:      General: No focal deficit present.      Mental Status: She is alert. She is disoriented.         Significant Labs:   All pertinent labs from the last 24 hours have been reviewed.    Diagnostic Results:  I have reviewed all pertinent imaging results/findings within the past 24 hours.

## 2020-12-10 NOTE — PLAN OF CARE
Uneventful shift. Pt admitted to floor from outside hospital. Pt remains oriented to self only. IV abx regimen initiated. Pt urine sample sent. Pt Mg replaced. Pt turned q2 to prevent skin breakdown. Pt has remained free from injury this shift. Bed in low locked position. Call light and personal belongings within reach. Side rails up x2. Nonskid socks in place. Pt instructed to call with any needs. Will continue to monitor.

## 2020-12-10 NOTE — ASSESSMENT & PLAN NOTE
-- CT head at OSH shows no acute pathology  -- Hypercalcemia and infection likely contributing    Plan  -improving with treatment of hypercalcemia  -- Manage hypercalcemia with IVF  -- TSH- wnl  -- Treat underlying infection  -- Delirium precautions

## 2020-12-10 NOTE — PROGRESS NOTES
[facility]  Hematology/Oncology  Progress Note    Patient Name: Mickie Pan  Admission Date: 12/9/2020  Hospital Length of Stay: 1 days  Code Status: Full Code     Subjective:     HPI:  Mickie Pan is an 84-year-old female with a PMHx of overactive bladder who presents to [facility] [place] a transfer from [facility] in [place], [place] for further evaluation for suspected multiple myeloma as well as altered mental status + UTI/Pneumonia.     Pt has been having intermittent right neck pain for the past 6 months; pain worsens in the morning upon awakening & exacerbates with direct contact. Pt also having bilateral arm pain.    Interval History: NAEON. CT CAP concerning for metastatic disease.     Oncology Treatment Plan:   [No treatment plan]    Medications:  Continuous Infusions:  Scheduled Meds:   albuterol-ipratropium  3 mL Nebulization Q6H WAKE    aspirin  81 mg Oral Daily    enoxaparin  40 mg Subcutaneous Q24H    [START ON 12/11/2020] levoFLOXacin  750 mg Oral Every other day    mupirocin   Nasal BID    potassium, sodium phosphates  2 packet Oral Once     PRN Meds:melatonin, ondansetron, sodium chloride 0.9%     Review of Systems   Unable to perform ROS: Mental status change   Respiratory: Negative for shortness of breath.    Cardiovascular: Negative for chest pain and leg swelling.   Musculoskeletal: Positive for back pain, joint swelling and myalgias.   Psychiatric/Behavioral: Positive for confusion.     Objective:     Vital Signs (Most Recent):  Temp: 97.5 °F (36.4 °C) (12/10/20 1121)  Pulse: 74 (12/10/20 1143)  Resp: 16 (12/10/20 1143)  BP: (!) 144/77 (12/10/20 1121)  SpO2: (!) 94 % (12/10/20 1143) Vital Signs (24h Range):  Temp:  [97.5 °F (36.4 °C)-98.4 °F (36.9 °C)] 97.5 °F (36.4 °C)  Pulse:  [71-78] 74  Resp:  [16-20] 16  SpO2:  [94 %-99 %] 94 %  BP: (130-176)/(75-87) 144/77        There is no height or weight on file to calculate BMI.  There is no height or weight on file  to calculate BSA.      Intake/Output Summary (Last 24 hours) at 12/10/2020 1422  Last data filed at 12/10/2020 0300  Gross per 24 hour   Intake 250 ml   Output --   Net 250 ml       Physical Exam  HENT:      Head: Normocephalic and atraumatic.      Mouth/Throat:      Mouth: Mucous membranes are moist.   Eyes:      Extraocular Movements: Extraocular movements intact.   Cardiovascular:      Rate and Rhythm: Normal rate and regular rhythm.      Pulses: Normal pulses.      Heart sounds: Normal heart sounds.   Pulmonary:      Effort: Pulmonary effort is normal.   Abdominal:      General: Abdomen is flat.      Palpations: Abdomen is soft.   Musculoskeletal:      Right shoulder: She exhibits tenderness.      Right lower leg: No edema.      Left lower leg: No edema.   Skin:     Capillary Refill: Capillary refill takes less than 2 seconds.   Neurological:      General: No focal deficit present.      Mental Status: She is alert. She is disoriented.         Significant Labs:   CBC:   Recent Labs   Lab 12/09/20 2150 12/10/20  0342   WBC 12.52 12.55   HGB 10.2* 9.8*   HCT 31.9* 31.1*    302    and CMP:   Recent Labs   Lab 12/09/20  2150 12/10/20  0342    144   K 3.8 3.4*   * 112*   CO2 23 21*   GLU 88 94   BUN 20 20   CREATININE 1.0 1.0   CALCIUM 9.6 8.9   PROT 5.5* 5.0*   ALBUMIN 2.9* 2.7*   BILITOT 0.4 0.3   ALKPHOS 62 59   AST 21 23   ALT 15 14   ANIONGAP 9 11   EGFRNONAA 52.2* 52.2*       Diagnostic Results:  I have reviewed all pertinent imaging results/findings within the past 24 hours.    Assessment/Plan:     * Encephalopathy, metabolic  -- CT head at OSH shows no acute pathology  -- Hypercalcemia and infection likely contributing    Plan  -improving with treatment of hypercalcemia  -- Manage hypercalcemia with IVF  -- TSH- wnl  -- Treat underlying infection  -- Delirium precautions    Hypercalcemia  -- Elevated Ca of 14.4 on CMP  -- Rreated with IVF's, calcitonin, zometa at OSH  -- Patient is awake  but confused following simple commands.   -- ionized calcium within normal limits after IV fluids, calcitonin, zoledronic acid    Plan  -- Serial chemistries      UTI (urinary tract infection)  -- Urine cx growing E coli at OSH    Plan  -- IV CTX (today is day 3)  -- repeat urinalysis normal  Will complete antibiotic course with Levaquin every other day starting 12/11/2020.      Pneumonia  Community Acquired Pneumonia    -- Chest Xray: left basilar atelectasis with developing consolidation    Plan:  -- IV CTX + Azithromycin (today is day 3)  Will transition as IV antibiotics to oral Levaquin every other day starting 12/11/2020.  -- Oxygen prn for spo2 <90  -- Duo-nebs q6 hours  -- Acapella and Incentive Spirometer      Multiple myeloma  -- Right shoulder xray: no evidence of fx. Small cortical lucencies suspicious for multiple myeloma.   -- Serum and urine protein electrophoresis collected but are send out labs.   [place] free light chain:  264.28  Lambda free light chain 0.38  Kappa lambda ratio 695.50 (elevated)      Plan:  --serum and urine protein electrophoresis sent.  Free light chain analysis consistent with multiple myeloma   Will plan for bone biopsy on either 12/11/2020 or as outpatient basis, pending clinical improvement of altered mental state.               Mango Black MD  Hematology/Oncology  [facility]

## 2020-12-10 NOTE — ASSESSMENT & PLAN NOTE
-- Urine cx growing E coli at OSH    Plan  -- IV CTX (today is day 3)  -- repeat urinalysis normal  Will complete antibiotic course with Levaquin every other day starting 12/11/2020.

## 2020-12-10 NOTE — SUBJECTIVE & OBJECTIVE
Interval History: NAEON. CT CAP concerning for metastatic disease.     Oncology Treatment Plan:   [No treatment plan]    Medications:  Continuous Infusions:  Scheduled Meds:   albuterol-ipratropium  3 mL Nebulization Q6H WAKE    aspirin  81 mg Oral Daily    enoxaparin  40 mg Subcutaneous Q24H    [START ON 12/11/2020] levoFLOXacin  750 mg Oral Every other day    mupirocin   Nasal BID    potassium, sodium phosphates  2 packet Oral Once     PRN Meds:melatonin, ondansetron, sodium chloride 0.9%     Review of Systems   Unable to perform ROS: Mental status change   Respiratory: Negative for shortness of breath.    Cardiovascular: Negative for chest pain and leg swelling.   Musculoskeletal: Positive for back pain, joint swelling and myalgias.   Psychiatric/Behavioral: Positive for confusion.     Objective:     Vital Signs (Most Recent):  Temp: 97.5 °F (36.4 °C) (12/10/20 1121)  Pulse: 74 (12/10/20 1143)  Resp: 16 (12/10/20 1143)  BP: (!) 144/77 (12/10/20 1121)  SpO2: (!) 94 % (12/10/20 1143) Vital Signs (24h Range):  Temp:  [97.5 °F (36.4 °C)-98.4 °F (36.9 °C)] 97.5 °F (36.4 °C)  Pulse:  [71-78] 74  Resp:  [16-20] 16  SpO2:  [94 %-99 %] 94 %  BP: (130-176)/(75-87) 144/77        There is no height or weight on file to calculate BMI.  There is no height or weight on file to calculate BSA.      Intake/Output Summary (Last 24 hours) at 12/10/2020 1422  Last data filed at 12/10/2020 0300  Gross per 24 hour   Intake 250 ml   Output --   Net 250 ml       Physical Exam  HENT:      Head: Normocephalic and atraumatic.      Mouth/Throat:      Mouth: Mucous membranes are moist.   Eyes:      Extraocular Movements: Extraocular movements intact.   Cardiovascular:      Rate and Rhythm: Normal rate and regular rhythm.      Pulses: Normal pulses.      Heart sounds: Normal heart sounds.   Pulmonary:      Effort: Pulmonary effort is normal.   Abdominal:      General: Abdomen is flat.      Palpations: Abdomen is soft.   Musculoskeletal:       Right shoulder: She exhibits tenderness.      Right lower leg: No edema.      Left lower leg: No edema.   Skin:     Capillary Refill: Capillary refill takes less than 2 seconds.   Neurological:      General: No focal deficit present.      Mental Status: She is alert. She is disoriented.         Significant Labs:   CBC:   Recent Labs   Lab 12/09/20  2150 12/10/20  0342   WBC 12.52 12.55   HGB 10.2* 9.8*   HCT 31.9* 31.1*    302    and CMP:   Recent Labs   Lab 12/09/20  2150 12/10/20  0342    144   K 3.8 3.4*   * 112*   CO2 23 21*   GLU 88 94   BUN 20 20   CREATININE 1.0 1.0   CALCIUM 9.6 8.9   PROT 5.5* 5.0*   ALBUMIN 2.9* 2.7*   BILITOT 0.4 0.3   ALKPHOS 62 59   AST 21 23   ALT 15 14   ANIONGAP 9 11   EGFRNONAA 52.2* 52.2*       Diagnostic Results:  I have reviewed all pertinent imaging results/findings within the past 24 hours.

## 2020-12-10 NOTE — ASSESSMENT & PLAN NOTE
Community Acquired Pneumonia    -- Chest Xray: left basilar atelectasis with developing consolidation    Plan:  -- Repeat CXR  -- Continue IV CTX + Azithromycin (today is day 3)  -- Oxygen prn for spo2 <90  -- Duo-nebs q6 hours  -- Acapella and Incentive Spirometer

## 2020-12-10 NOTE — ASSESSMENT & PLAN NOTE
-- Elevated Ca of 14.4 on CMP  -- Rreated with IVF's, calcitonin, zometa at OSH  -- Patient is awake but confused following simple commands.     Plan  -- Serial chemistries  -- IVF's for hypercalcemia

## 2020-12-10 NOTE — HPI
Mickie Pan is an 84-year-old female with a PMHx of overactive bladder who presents to [facility] as a transfer from [facility] in [place], [place] for further evaluation for suspected multiple myeloma as well as altered mental status + UTI/Pneumonia.     Pt has been having intermittent right neck pain for the past 6 months; pain worsens in the morning upon awakening & exacerbates with direct contact. Pt also having bilateral arm pain.

## 2020-12-10 NOTE — PLAN OF CARE
Evaluation complete and goals set.  Cont with POC  Aranza Shabazz OT  12/10/2020    Problem: Occupational Therapy Goal  Goal: Occupational Therapy Goal  Description: Goals to be met by: 12/31     Patient will increase functional independence with ADLs by performing:    UE Dressing with [place].  LE Dressing with [place].  Grooming while standing with Set-up Assistance.  Toileting from toilet with [place] for hygiene and clothing management.     Outcome: Ongoing, Progressing

## 2020-12-10 NOTE — ASSESSMENT & PLAN NOTE
-- Elevated Ca of 14.4 on CMP  -- Rreated with IVF's, calcitonin, zometa at OSH  -- Patient is awake but confused following simple commands.   -- ionized calcium within normal limits after IV fluids, calcitonin, zoledronic acid    Plan  -- Serial chemistries

## 2020-12-10 NOTE — ASSESSMENT & PLAN NOTE
-- Right shoulder xray: no evidence of fx. Small cortical lucencies suspicious for multiple myeloma.   -- Serum and urine protein electrophoresis collected but are send out labs.   [place] free light chain:  264.28  Lambda free light chain 0.38  Kappa lambda ratio 695.50 (elevated)      Plan:  --serum and urine protein electrophoresis sent.  Free light chain analysis consistent with multiple myeloma   Will plan for bone biopsy on either 12/11/2020 or as outpatient basis, pending clinical improvement of altered mental state.

## 2020-12-11 PROBLEM — R53.81 PHYSICAL DECONDITIONING: Status: ACTIVE | Noted: 2020-12-11

## 2020-12-11 LAB
ALBUMIN SERPL BCP-MCNC: 3 G/DL (ref 3.5–5.2)
ALP SERPL-CCNC: 68 U/L (ref 55–135)
ALT SERPL W/O P-5'-P-CCNC: 17 U/L (ref 10–44)
ANION GAP SERPL CALC-SCNC: 12 MMOL/L (ref 8–16)
AST SERPL-CCNC: 23 U/L (ref 10–40)
BASOPHILS # BLD AUTO: 0.05 K/UL (ref 0–0.2)
BASOPHILS NFR BLD: 0.5 % (ref 0–1.9)
BILIRUB SERPL-MCNC: 0.5 MG/DL (ref 0.1–1)
BUN SERPL-MCNC: 21 MG/DL (ref 8–23)
CALCIUM SERPL-MCNC: 8.5 MG/DL (ref 8.7–10.5)
CHLORIDE SERPL-SCNC: 112 MMOL/L (ref 95–110)
CO2 SERPL-SCNC: 23 MMOL/L (ref 23–29)
CREAT SERPL-MCNC: 1.1 MG/DL (ref 0.5–1.4)
DIFFERENTIAL METHOD: ABNORMAL
EOSINOPHIL # BLD AUTO: 0.3 K/UL (ref 0–0.5)
EOSINOPHIL NFR BLD: 2.3 % (ref 0–8)
ERYTHROCYTE [DISTWIDTH] IN BLOOD BY AUTOMATED COUNT: 13.3 % (ref 11.5–14.5)
EST. GFR  (AFRICAN AMERICAN): 53.7 ML/MIN/1.73 M^2
EST. GFR  (NON AFRICAN AMERICAN): 46.5 ML/MIN/1.73 M^2
GLUCOSE SERPL-MCNC: 81 MG/DL (ref 70–110)
HCT VFR BLD AUTO: 33.1 % (ref 37–48.5)
HGB BLD-MCNC: 10.6 G/DL (ref 12–16)
IMM GRANULOCYTES # BLD AUTO: 0.16 K/UL (ref 0–0.04)
IMM GRANULOCYTES NFR BLD AUTO: 1.5 % (ref 0–0.5)
LYMPHOCYTES # BLD AUTO: 1.4 K/UL (ref 1–4.8)
LYMPHOCYTES NFR BLD: 13.2 % (ref 18–48)
MAGNESIUM SERPL-MCNC: 2 MG/DL (ref 1.6–2.6)
MCH RBC QN AUTO: 32.2 PG (ref 27–31)
MCHC RBC AUTO-ENTMCNC: 32 G/DL (ref 32–36)
MCV RBC AUTO: 101 FL (ref 82–98)
MONOCYTES # BLD AUTO: 0.8 K/UL (ref 0.3–1)
MONOCYTES NFR BLD: 7 % (ref 4–15)
NEUTROPHILS # BLD AUTO: 8.1 K/UL (ref 1.8–7.7)
NEUTROPHILS NFR BLD: 75.5 % (ref 38–73)
NRBC BLD-RTO: 0 /100 WBC
PHOSPHATE SERPL-MCNC: 4.2 MG/DL (ref 2.7–4.5)
PHOSPHATE SERPL-MCNC: 4.2 MG/DL (ref 2.7–4.5)
PLATELET # BLD AUTO: 344 K/UL (ref 150–350)
PMV BLD AUTO: 10.2 FL (ref 9.2–12.9)
POTASSIUM SERPL-SCNC: 3.5 MMOL/L (ref 3.5–5.1)
PROT SERPL-MCNC: 5.7 G/DL (ref 6–8.4)
RBC # BLD AUTO: 3.29 M/UL (ref 4–5.4)
SODIUM SERPL-SCNC: 147 MMOL/L (ref 136–145)
WBC # BLD AUTO: 10.73 K/UL (ref 3.9–12.7)

## 2020-12-11 PROCEDURE — 88313 SPECIAL STAINS GROUP 2: CPT | Mod: 26,,, | Performed by: PATHOLOGY

## 2020-12-11 PROCEDURE — 94761 N-INVAS EAR/PLS OXIMETRY MLT: CPT

## 2020-12-11 PROCEDURE — 88305 TISSUE EXAM BY PATHOLOGIST: ICD-10-PCS | Mod: 26,,, | Performed by: PATHOLOGY

## 2020-12-11 PROCEDURE — 99900035 HC TECH TIME PER 15 MIN (STAT)

## 2020-12-11 PROCEDURE — 85097 PR  BONE MARROW,SMEAR INTERPRETATION: ICD-10-PCS | Mod: ,,, | Performed by: PATHOLOGY

## 2020-12-11 PROCEDURE — 97530 THERAPEUTIC ACTIVITIES: CPT

## 2020-12-11 PROCEDURE — 88313 PR  SPECIAL STAINS,GROUP II: ICD-10-PCS | Mod: 26,,, | Performed by: PATHOLOGY

## 2020-12-11 PROCEDURE — 38221 DX BONE MARROW BIOPSIES: CPT

## 2020-12-11 PROCEDURE — 84100 ASSAY OF PHOSPHORUS: CPT

## 2020-12-11 PROCEDURE — 97161 PT EVAL LOW COMPLEX 20 MIN: CPT

## 2020-12-11 PROCEDURE — 27000221 HC OXYGEN, UP TO 24 HOURS

## 2020-12-11 PROCEDURE — 88274 CYTOGENETICS 25-99: CPT | Mod: 59

## 2020-12-11 PROCEDURE — 99233 SBSQ HOSP IP/OBS HIGH 50: CPT | Mod: GC,,, | Performed by: INTERNAL MEDICINE

## 2020-12-11 PROCEDURE — 63600175 PHARM REV CODE 636 W HCPCS: Performed by: STUDENT IN AN ORGANIZED HEALTH CARE EDUCATION/TRAINING PROGRAM

## 2020-12-11 PROCEDURE — 88342 CHG IMMUNOCYTOCHEMISTRY: ICD-10-PCS | Mod: 26,59,, | Performed by: PATHOLOGY

## 2020-12-11 PROCEDURE — 88271 CYTOGENETICS DNA PROBE: CPT | Mod: 59

## 2020-12-11 PROCEDURE — 88305 TISSUE EXAM BY PATHOLOGIST: CPT | Performed by: PATHOLOGY

## 2020-12-11 PROCEDURE — 25000003 PHARM REV CODE 250: Performed by: STUDENT IN AN ORGANIZED HEALTH CARE EDUCATION/TRAINING PROGRAM

## 2020-12-11 PROCEDURE — 94640 AIRWAY INHALATION TREATMENT: CPT

## 2020-12-11 PROCEDURE — 94664 DEMO&/EVAL PT USE INHALER: CPT

## 2020-12-11 PROCEDURE — 88342 IMHCHEM/IMCYTCHM 1ST ANTB: CPT | Performed by: PATHOLOGY

## 2020-12-11 PROCEDURE — 88341 PR IHC OR ICC EACH ADD'L SINGLE ANTIBODY  STAINPR: ICD-10-PCS | Mod: 26,59,, | Performed by: PATHOLOGY

## 2020-12-11 PROCEDURE — 88299 UNLISTED CYTOGENETIC STUDY: CPT

## 2020-12-11 PROCEDURE — 88313 SPECIAL STAINS GROUP 2: CPT | Performed by: PATHOLOGY

## 2020-12-11 PROCEDURE — 25000242 PHARM REV CODE 250 ALT 637 W/ HCPCS: Performed by: STUDENT IN AN ORGANIZED HEALTH CARE EDUCATION/TRAINING PROGRAM

## 2020-12-11 PROCEDURE — 88342 IMHCHEM/IMCYTCHM 1ST ANTB: CPT | Mod: 26,59,, | Performed by: PATHOLOGY

## 2020-12-11 PROCEDURE — 88305 TISSUE EXAM BY PATHOLOGIST: CPT | Mod: 26,,, | Performed by: PATHOLOGY

## 2020-12-11 PROCEDURE — 88341 IMHCHEM/IMCYTCHM EA ADD ANTB: CPT | Mod: 26,59,, | Performed by: PATHOLOGY

## 2020-12-11 PROCEDURE — 20600001 HC STEP DOWN PRIVATE ROOM

## 2020-12-11 PROCEDURE — 88237 TISSUE CULTURE BONE MARROW: CPT

## 2020-12-11 PROCEDURE — 80053 COMPREHEN METABOLIC PANEL: CPT

## 2020-12-11 PROCEDURE — 25000003 PHARM REV CODE 250: Performed by: INTERNAL MEDICINE

## 2020-12-11 PROCEDURE — 85025 COMPLETE CBC W/AUTO DIFF WBC: CPT

## 2020-12-11 PROCEDURE — 99233 PR SUBSEQUENT HOSPITAL CARE,LEVL III: ICD-10-PCS | Mod: GC,,, | Performed by: INTERNAL MEDICINE

## 2020-12-11 PROCEDURE — 88189 FLOWCYTOMETRY/READ 16 & >: CPT | Mod: ,,, | Performed by: PATHOLOGY

## 2020-12-11 PROCEDURE — 88184 FLOWCYTOMETRY/ TC 1 MARKER: CPT | Performed by: PATHOLOGY

## 2020-12-11 PROCEDURE — 85097 BONE MARROW INTERPRETATION: CPT | Mod: ,,, | Performed by: PATHOLOGY

## 2020-12-11 PROCEDURE — 88341 IMHCHEM/IMCYTCHM EA ADD ANTB: CPT | Performed by: PATHOLOGY

## 2020-12-11 PROCEDURE — 88185 FLOWCYTOMETRY/TC ADD-ON: CPT | Performed by: PATHOLOGY

## 2020-12-11 PROCEDURE — 36415 COLL VENOUS BLD VENIPUNCTURE: CPT

## 2020-12-11 PROCEDURE — 88311 PR  DECALCIFY TISSUE: ICD-10-PCS | Mod: 26,,, | Performed by: PATHOLOGY

## 2020-12-11 PROCEDURE — 83735 ASSAY OF MAGNESIUM: CPT

## 2020-12-11 PROCEDURE — 88271 CYTOGENETICS DNA PROBE: CPT

## 2020-12-11 PROCEDURE — 88189 PR  FLOWCYTOMETRY/READ, 16 & > MARKERS: ICD-10-PCS | Mod: ,,, | Performed by: PATHOLOGY

## 2020-12-11 PROCEDURE — 88311 DECALCIFY TISSUE: CPT | Mod: 26,,, | Performed by: PATHOLOGY

## 2020-12-11 PROCEDURE — 88311 DECALCIFY TISSUE: CPT | Performed by: PATHOLOGY

## 2020-12-11 RX ORDER — HYDROMORPHONE HYDROCHLORIDE 1 MG/ML
0.5 INJECTION, SOLUTION INTRAMUSCULAR; INTRAVENOUS; SUBCUTANEOUS ONCE
Status: COMPLETED | OUTPATIENT
Start: 2020-12-11 | End: 2020-12-11

## 2020-12-11 RX ORDER — LIDOCAINE HYDROCHLORIDE 20 MG/ML
5 INJECTION, SOLUTION EPIDURAL; INFILTRATION; INTRACAUDAL; PERINEURAL ONCE
Status: COMPLETED | OUTPATIENT
Start: 2020-12-11 | End: 2020-12-11

## 2020-12-11 RX ORDER — IPRATROPIUM BROMIDE AND ALBUTEROL SULFATE 2.5; .5 MG/3ML; MG/3ML
3 SOLUTION RESPIRATORY (INHALATION) EVERY 6 HOURS PRN
Status: DISCONTINUED | OUTPATIENT
Start: 2020-12-11 | End: 2020-12-15 | Stop reason: HOSPADM

## 2020-12-11 RX ADMIN — ENOXAPARIN SODIUM 40 MG: 40 INJECTION SUBCUTANEOUS at 06:12

## 2020-12-11 RX ADMIN — MUPIROCIN: 20 OINTMENT TOPICAL at 09:12

## 2020-12-11 RX ADMIN — IPRATROPIUM BROMIDE AND ALBUTEROL SULFATE 3 ML: .5; 2.5 SOLUTION RESPIRATORY (INHALATION) at 08:12

## 2020-12-11 RX ADMIN — LIDOCAINE HYDROCHLORIDE 100 MG: 20 INJECTION, SOLUTION EPIDURAL; INFILTRATION; INTRACAUDAL; PERINEURAL at 03:12

## 2020-12-11 RX ADMIN — HYDROMORPHONE HYDROCHLORIDE 0.5 MG: 1 INJECTION, SOLUTION INTRAMUSCULAR; INTRAVENOUS; SUBCUTANEOUS at 03:12

## 2020-12-11 RX ADMIN — MUPIROCIN: 20 OINTMENT TOPICAL at 08:12

## 2020-12-11 RX ADMIN — LEVOFLOXACIN 750 MG: 500 TABLET, FILM COATED ORAL at 08:12

## 2020-12-11 RX ADMIN — ASPIRIN 81 MG: 81 TABLET, COATED ORAL at 08:12

## 2020-12-11 NOTE — PT/OT/SLP EVAL
Physical Therapy Evaluation    Patient Name:  Mickie Pan   MRN:  [id number]    Recommendations:     Discharge Recommendations:  nursing facility, skilled   Discharge Equipment Recommendations: walker, rolling, bedside commode   Barriers to discharge: pt. lives alone, but pt.'s son lives on the property. Unsure of his availability to assist    Assessment:     Mickie Pan is a 84 y.o. female admitted with a medical diagnosis of Encephalopathy, metabolic.  She presents with the following impairments/functional limitations:  weakness, impaired endurance, impaired self care skills, impaired functional mobilty, gait instability, impaired balance, decreased safety awareness Pt. cooperative and tolerated treatment fairly well but required significant assistance for mobility/safety.    Rehab Prognosis: Good; patient would benefit from acute skilled PT services to address these deficits and reach maximum level of function.    Recent Surgery: * No surgery found *      Plan:     During this hospitalization, patient to be seen 4 x/week to address the identified rehab impairments via gait training, therapeutic activities, therapeutic exercises and progress toward the following goals:    · Plan of Care Expires:  01/10/21    Subjective     Chief Complaint: knee stiffness and generalized weakness  Patient/Family Comments/goals: to get stronger to be able to go home and back to work  Pain/Comfort:  · Pain Rating 1: 0/10    Patients cultural, spiritual, Evangelical conflicts given the current situation: no    Living Environment:  Pt. Lives alone in [facility] with 4-5 JEOVANY and handrails  Prior to admission, patients level of function was indep.  Equipment used at home: none.  Upon discharge, patient will have assistance from son, but unsure of his availability.    Objective:     Communicated with nursing prior to session.  Patient found supine with PureWick, peripheral IV  upon PT entry to room.    General Precautions:  Standard, fall   Orthopedic Precautions:N/A   Braces: N/A     Exams:  · RLE ROM: WFL  · RLE Strength: WFL  · LLE ROM: WFL  · LLE Strength: WFL    Functional Mobility:  · Bed Mobility:     · Rolling Left:  minimum assistance  · Scooting: minimum assistance  · Supine to Sit: minimum assistance and moderate assistance  · Sit to Supine: moderate assistance  · Transfers:     · Sit to Stand:  minimum assistance and moderate assistance with rolling walker  · Gait: 15' with RW and Min-Mod A for balance/safety. Pt. amb. with decreased step length/nathalie and unsteady gait  · Balance: poor+ dynamic standing    Therapeutic Activities and Exercises:   Assisted pt. with john-care due to loose BM x2 episodes during session. Discussed therapy/DME needs, goals, SNF/Rehab, and POC.    AM-PAC 6 CLICK MOBILITY  Total Score:12     Patient left supine with all lines intact, call button in reach and nursing notified.    GOALS:   Multidisciplinary Problems     Physical Therapy Goals        Problem: Physical Therapy Goal    Goal Priority Disciplines Outcome Goal Variances Interventions   Physical Therapy Goal     PT, PT/OT Ongoing, Progressing     Description: Goals to be met by: 2020     Patient will increase functional independence with mobility by performin. Supine to sit with Set-up [place]  2. Sit to supine with Set-up [place]  3. Sit to stand transfer with Supervision  4. Bed to chair transfer with Supervision using LRAD  5. Gait  x 150 feet with Supervision using LRAD.   6. Lower extremity exercise program x15 reps per handout, with supervision  7. Ascend/descend 5 stairs with handrail and Supervision                     History:     Past Medical History:   Diagnosis Date    Overactive bladder        No past surgical history on file.    Time Tracking:     PT Received On: 20  PT Start Time: 1236     PT Stop Time: 1302  PT Total Time (min): 26 min     Billable Minutes: Evaluation 16 and Therapeutic  Activity 10      Lb Purdy, PT  12/11/2020

## 2020-12-11 NOTE — PLAN OF CARE
Problem: Physical Therapy Goal  Goal: Physical Therapy Goal  Description: Goals to be met by: 2020     Patient will increase functional independence with mobility by performin. Supine to sit with Set-up [place]  2. Sit to supine with Set-up [place]  3. Sit to stand transfer with Supervision  4. Bed to chair transfer with Supervision using LRAD  5. Gait  x 150 feet with Supervision using LRAD.   6. Lower extremity exercise program x15 reps per handout, with supervision    Outcome: Ongoing, Progressing

## 2020-12-11 NOTE — PLAN OF CARE
Patient remained alert today; sleep between care; intermittent disorientation to time & place. Assessment completed & no alarming findings found.VS remained stable. All schedules/PRN medications administered as ordered. No complaints of pain. Tolerating a regular diet; refused all meals; good fluid intake.Turned; weight shift assistance provided. Voids per incontinent pad; bed pan provided. Fall/safety reviewed with patient: side rails up x3; call bell in place; bed in lowest, locked position; skid proof socks on; no evidence of skin breakdown; care plan explained to patient; no additional complaints at this time. Will continue routine plan of care.

## 2020-12-11 NOTE — PROGRESS NOTES
OSMEL met with pt, her son (Tony [phone]) and Dr. Delvalle bedside.  PT recommending SNF.  Osmel provided SNF options and [facility] ([phone])  Fax( [phone]).  Osmel faxed relevant information to Elena including on call OSMEL Dixon's contact information.  She will contact back about bed availability, but possibly not until Monday, 12/14/20.  Report given to on call OSMEL.

## 2020-12-11 NOTE — PLAN OF CARE
POC reviewed with patient; understanding verbalized. Pt remains confused to time and place throughout shift. Pt maintaining sats on 2L of O2. Pt voiding via purewick. Pt with nonskid footwear on, bed in lowest position, and locked with bed rails up x2. Pt instructed to call prior to getting OOB. Pt has call light and personal items within reach. VSS and afebrile this shift. All questions and concerns addressed at this time. Will continue to monitor.

## 2020-12-11 NOTE — PLAN OF CARE
Patient remained awake, alert, and oriented throughout shift. Intermittent disorientation to time and situation. Assessment completed & no alarming findings found. Bone marrow biopsy completed per MD today. VS remained stable.All schedules/PRN medications administered as ordered. No complaints of pain. Tolerating a regular diet without any difficulty. Improved intake from yesterday. Ambulants with assistance. Weight shift assistance provided. Incontinent pads in use. One BM noted today. Fall/safety reviewed with patient: side rails up x3; call bell in place; bed in lowest, locked position; skid proof socks on; no evidence of skin breakdown; care plan explained to patient; no additional complaints at this time. Will continue routine plan of care.

## 2020-12-12 LAB
ALBUMIN SERPL BCP-MCNC: 2.8 G/DL (ref 3.5–5.2)
ALP SERPL-CCNC: 65 U/L (ref 55–135)
ALT SERPL W/O P-5'-P-CCNC: 18 U/L (ref 10–44)
ANION GAP SERPL CALC-SCNC: 12 MMOL/L (ref 8–16)
AST SERPL-CCNC: 25 U/L (ref 10–40)
BASOPHILS # BLD AUTO: 0.05 K/UL (ref 0–0.2)
BASOPHILS NFR BLD: 0.5 % (ref 0–1.9)
BILIRUB SERPL-MCNC: 0.4 MG/DL (ref 0.1–1)
BUN SERPL-MCNC: 22 MG/DL (ref 8–23)
CALCIUM SERPL-MCNC: 7.6 MG/DL (ref 8.7–10.5)
CHLORIDE SERPL-SCNC: 108 MMOL/L (ref 95–110)
CHROM BANDING METHOD: NORMAL
CHROMOSOME ANALYSIS BM ADDITIONAL INFORMATION: NORMAL
CHROMOSOME ANALYSIS BM RELEASED BY: NORMAL
CHROMOSOME ANALYSIS BM RESULT SUMMARY: NORMAL
CLINICAL CYTOGENETICIST REVIEW: NORMAL
CO2 SERPL-SCNC: 23 MMOL/L (ref 23–29)
CREAT SERPL-MCNC: 1.1 MG/DL (ref 0.5–1.4)
DIFFERENTIAL METHOD: ABNORMAL
EOSINOPHIL # BLD AUTO: 0.3 K/UL (ref 0–0.5)
EOSINOPHIL NFR BLD: 2.7 % (ref 0–8)
ERYTHROCYTE [DISTWIDTH] IN BLOOD BY AUTOMATED COUNT: 13.3 % (ref 11.5–14.5)
EST. GFR  (AFRICAN AMERICAN): 53.7 ML/MIN/1.73 M^2
EST. GFR  (NON AFRICAN AMERICAN): 46.5 ML/MIN/1.73 M^2
GLUCOSE SERPL-MCNC: 87 MG/DL (ref 70–110)
HCT VFR BLD AUTO: 32.3 % (ref 37–48.5)
HGB BLD-MCNC: 10.4 G/DL (ref 12–16)
IMM GRANULOCYTES # BLD AUTO: 0.15 K/UL (ref 0–0.04)
IMM GRANULOCYTES NFR BLD AUTO: 1.5 % (ref 0–0.5)
KARYOTYP MAR: NORMAL
LYMPHOCYTES # BLD AUTO: 1.2 K/UL (ref 1–4.8)
LYMPHOCYTES NFR BLD: 12.2 % (ref 18–48)
MAGNESIUM SERPL-MCNC: 2 MG/DL (ref 1.6–2.6)
MCH RBC QN AUTO: 32.4 PG (ref 27–31)
MCHC RBC AUTO-ENTMCNC: 32.2 G/DL (ref 32–36)
MCV RBC AUTO: 101 FL (ref 82–98)
MONOCYTES # BLD AUTO: 0.8 K/UL (ref 0.3–1)
MONOCYTES NFR BLD: 8.2 % (ref 4–15)
NEUTROPHILS # BLD AUTO: 7.6 K/UL (ref 1.8–7.7)
NEUTROPHILS NFR BLD: 74.9 % (ref 38–73)
NRBC BLD-RTO: 0 /100 WBC
PHOSPHATE SERPL-MCNC: 2.6 MG/DL (ref 2.7–4.5)
PHOSPHATE SERPL-MCNC: 2.6 MG/DL (ref 2.7–4.5)
PLATELET # BLD AUTO: 346 K/UL (ref 150–350)
PMV BLD AUTO: 10.2 FL (ref 9.2–12.9)
POTASSIUM SERPL-SCNC: 3.5 MMOL/L (ref 3.5–5.1)
PROT SERPL-MCNC: 5.5 G/DL (ref 6–8.4)
RBC # BLD AUTO: 3.21 M/UL (ref 4–5.4)
REASON FOR REFERRAL (NARRATIVE): NORMAL
REF LAB TEST METHOD: NORMAL
SODIUM SERPL-SCNC: 143 MMOL/L (ref 136–145)
SPECIMEN SOURCE: NORMAL
SPECIMEN: NORMAL
WBC # BLD AUTO: 10.14 K/UL (ref 3.9–12.7)

## 2020-12-12 PROCEDURE — 20600001 HC STEP DOWN PRIVATE ROOM

## 2020-12-12 PROCEDURE — 83735 ASSAY OF MAGNESIUM: CPT

## 2020-12-12 PROCEDURE — 27000646 HC AEROBIKA DEVICE

## 2020-12-12 PROCEDURE — 99233 PR SUBSEQUENT HOSPITAL CARE,LEVL III: ICD-10-PCS | Mod: GC,,, | Performed by: INTERNAL MEDICINE

## 2020-12-12 PROCEDURE — 99900035 HC TECH TIME PER 15 MIN (STAT)

## 2020-12-12 PROCEDURE — 94761 N-INVAS EAR/PLS OXIMETRY MLT: CPT

## 2020-12-12 PROCEDURE — 25000003 PHARM REV CODE 250: Performed by: STUDENT IN AN ORGANIZED HEALTH CARE EDUCATION/TRAINING PROGRAM

## 2020-12-12 PROCEDURE — 99233 SBSQ HOSP IP/OBS HIGH 50: CPT | Mod: GC,,, | Performed by: INTERNAL MEDICINE

## 2020-12-12 PROCEDURE — 94664 DEMO&/EVAL PT USE INHALER: CPT

## 2020-12-12 PROCEDURE — 84100 ASSAY OF PHOSPHORUS: CPT

## 2020-12-12 PROCEDURE — 36415 COLL VENOUS BLD VENIPUNCTURE: CPT

## 2020-12-12 PROCEDURE — 80053 COMPREHEN METABOLIC PANEL: CPT

## 2020-12-12 PROCEDURE — 85025 COMPLETE CBC W/AUTO DIFF WBC: CPT

## 2020-12-12 PROCEDURE — 94799 UNLISTED PULMONARY SVC/PX: CPT

## 2020-12-12 PROCEDURE — 63600175 PHARM REV CODE 636 W HCPCS: Performed by: STUDENT IN AN ORGANIZED HEALTH CARE EDUCATION/TRAINING PROGRAM

## 2020-12-12 RX ADMIN — ENOXAPARIN SODIUM 40 MG: 40 INJECTION SUBCUTANEOUS at 06:12

## 2020-12-12 RX ADMIN — ASPIRIN 81 MG: 81 TABLET, COATED ORAL at 09:12

## 2020-12-12 RX ADMIN — MUPIROCIN: 20 OINTMENT TOPICAL at 09:12

## 2020-12-12 NOTE — ASSESSMENT & PLAN NOTE
-- Elevated Ca of 14.4 on CMP  -- Rreated with IVF's, calcitonin, zometa at OSH  -- Patient is awake but confused following simple commands.   -- ionized calcium within normal limits after IV fluids, calcitonin, zoledronic acid  -Ca 7.6

## 2020-12-12 NOTE — ASSESSMENT & PLAN NOTE
Patient is complaining of weakness, unable to walk on her own.  Prior to this acute event., she was fully independent.  PT/OT w  Disposition pending thier recommendations, home with PT versus rehab

## 2020-12-12 NOTE — SUBJECTIVE & OBJECTIVE
Interval History: NAEON. AF VSS. AOx4. No pain after bone marrow biopsy yesterday.     Oncology Treatment Plan:   [No treatment plan]    Medications:  Continuous Infusions:  Scheduled Meds:   aspirin  81 mg Oral Daily    enoxaparin  40 mg Subcutaneous Q24H    levoFLOXacin  750 mg Oral Every other day    mupirocin   Nasal BID     PRN Meds:albuterol-ipratropium, melatonin, ondansetron, sodium chloride 0.9%     Review of Systems   Constitutional: Negative for chills and fever.   HENT: Negative for sore throat.    Respiratory: Negative for cough and shortness of breath.    Cardiovascular: Negative for chest pain and leg swelling.   Gastrointestinal: Negative for abdominal pain.   Genitourinary: Negative for dysuria.   Musculoskeletal: Positive for joint swelling. Negative for back pain and myalgias.   Psychiatric/Behavioral: Negative for confusion.     Objective:     Vital Signs (Most Recent):  Temp: 98.8 °F (37.1 °C) (12/12/20 0833)  Pulse: 80 (12/12/20 0833)  Resp: 18 (12/12/20 0321)  BP: (!) 147/68 (12/12/20 0833)  SpO2: (!) 92 % (12/12/20 0833) Vital Signs (24h Range):  Temp:  [98.1 °F (36.7 °C)-98.8 °F (37.1 °C)] 98.8 °F (37.1 °C)  Pulse:  [80-88] 80  Resp:  [16-19] 18  SpO2:  [92 %-98 %] 92 %  BP: (122-174)/(58-74) 147/68        There is no height or weight on file to calculate BMI.  There is no height or weight on file to calculate BSA.      Intake/Output Summary (Last 24 hours) at 12/12/2020 1048  Last data filed at 12/12/2020 0446  Gross per 24 hour   Intake 236 ml   Output 950 ml   Net -714 ml       Physical Exam  HENT:      Head: Normocephalic and atraumatic.      Mouth/Throat:      Mouth: Mucous membranes are moist.   Eyes:      Extraocular Movements: Extraocular movements intact.   Cardiovascular:      Rate and Rhythm: Normal rate and regular rhythm.      Pulses: Normal pulses.      Heart sounds: Normal heart sounds.   Pulmonary:      Effort: Pulmonary effort is normal.   Abdominal:      General:  Abdomen is flat.      Palpations: Abdomen is soft.   Musculoskeletal:      Right shoulder: She exhibits tenderness.      Right lower leg: No edema.      Left lower leg: No edema.   Skin:     Capillary Refill: Capillary refill takes less than 2 seconds.   Neurological:      General: No focal deficit present.      Mental Status: She is alert. She is disoriented.         Significant Labs:   CBC:   Recent Labs   Lab 12/11/20 0428 12/12/20 0424   WBC 10.73 10.14   HGB 10.6* 10.4*   HCT 33.1* 32.3*    346    and CMP:   Recent Labs   Lab 12/11/20 0428 12/12/20 0424   * 143   K 3.5 3.5   * 108   CO2 23 23   GLU 81 87   BUN 21 22   CREATININE 1.1 1.1   CALCIUM 8.5* 7.6*   PROT 5.7* 5.5*   ALBUMIN 3.0* 2.8*   BILITOT 0.5 0.4   ALKPHOS 68 65   AST 23 25   ALT 17 18   ANIONGAP 12 12   EGFRNONAA 46.5* 46.5*       Diagnostic Results:  I have reviewed all pertinent imaging results/findings within the past 24 hours.

## 2020-12-12 NOTE — SUBJECTIVE & OBJECTIVE
Interval History: patient is more alert and oriented x3.  We have discussed the findings with her concerning for multiple myeloma.  She wanted to establish her care here.  Plan on bone marrow biopsy today.     Oncology Treatment Plan:   [No treatment plan]    Medications:  Continuous Infusions:  Scheduled Meds:   aspirin  81 mg Oral Daily    enoxaparin  40 mg Subcutaneous Q24H    levoFLOXacin  750 mg Oral Every other day    mupirocin   Nasal BID     PRN Meds:albuterol-ipratropium, melatonin, ondansetron, sodium chloride 0.9%     Review of Systems   Constitutional: Positive for fatigue. Negative for unexpected weight change.   HENT: Negative.    Eyes: Negative.    Respiratory: Negative for shortness of breath.    Cardiovascular: Negative for chest pain and leg swelling.   Gastrointestinal: Negative.    Musculoskeletal: Negative for back pain, joint swelling and myalgias.   Skin: Negative.    Neurological: Positive for weakness.   Psychiatric/Behavioral: Negative for confusion.     Objective:     Vital Signs (Most Recent):  Temp: 98.1 °F (36.7 °C) (12/11/20 1616)  Pulse: 81 (12/11/20 1616)  Resp: 19 (12/11/20 1616)  BP: 138/65 (12/11/20 1616)  SpO2: 98 % (12/11/20 1616) Vital Signs (24h Range):  Temp:  [97.5 °F (36.4 °C)-98.4 °F (36.9 °C)] 98.1 °F (36.7 °C)  Pulse:  [65-87] 81  Resp:  [16-19] 19  SpO2:  [95 %-98 %] 98 %  BP: (122-173)/() 138/65        There is no height or weight on file to calculate BMI.  There is no height or weight on file to calculate BSA.      Intake/Output Summary (Last 24 hours) at 12/11/2020 1824  Last data filed at 12/11/2020 1404  Gross per 24 hour   Intake 827 ml   Output 350 ml   Net 477 ml       Physical Exam  HENT:      Head: Normocephalic and atraumatic.      Mouth/Throat:      Mouth: Mucous membranes are moist.   Eyes:      Extraocular Movements: Extraocular movements intact.   Cardiovascular:      Rate and Rhythm: Normal rate and regular rhythm.      Pulses: Normal pulses.       Heart sounds: Normal heart sounds.   Pulmonary:      Effort: Pulmonary effort is normal.   Abdominal:      General: Abdomen is flat.      Palpations: Abdomen is soft.   Musculoskeletal:      Right shoulder: She exhibits tenderness.      Right lower leg: No edema.      Left lower leg: No edema.   Skin:     Capillary Refill: Capillary refill takes less than 2 seconds.   Neurological:      General: No focal deficit present.      Mental Status: She is alert. She is disoriented.         Significant Labs:   CBC:   Recent Labs   Lab 12/09/20  2150 12/10/20  0342 12/11/20  0428   WBC 12.52 12.55 10.73   HGB 10.2* 9.8* 10.6*   HCT 31.9* 31.1* 33.1*    302 344    and CMP:   Recent Labs   Lab 12/09/20  2150 12/10/20  0342 12/11/20  0428    144 147*   K 3.8 3.4* 3.5   * 112* 112*   CO2 23 21* 23   GLU 88 94 81   BUN 20 20 21   CREATININE 1.0 1.0 1.1   CALCIUM 9.6 8.9 8.5*   PROT 5.5* 5.0* 5.7*   ALBUMIN 2.9* 2.7* 3.0*   BILITOT 0.4 0.3 0.5   ALKPHOS 62 59 68   AST 21 23 23   ALT 15 14 17   ANIONGAP 9 11 12   EGFRNONAA 52.2* 52.2* 46.5*       Diagnostic Results:  I have reviewed all pertinent imaging results/findings within the past 24 hours.

## 2020-12-12 NOTE — ASSESSMENT & PLAN NOTE
Community Acquired Pneumonia  -- Chest Xray: left basilar atelectasis with developing consolidation    Plan:  -- oral Levaquin every other day starting 12/11/2020.  -- Oxygen prn for spo2 <90  -- Duo-nebs q6 hours p.r.n.  -- Acapella and Incentive Spirometer  Last dose of Levaquin 12/13.

## 2020-12-12 NOTE — ASSESSMENT & PLAN NOTE
-- Urine cx growing E coli at OSH    Plan  -- IV CTX (today is day 3)  -- repeat urinalysis normal  Will complete antibiotic course with Levaquin every other day starting 12/11/2020.  Last dose 12/13.

## 2020-12-12 NOTE — ASSESSMENT & PLAN NOTE
-- Elevated Ca of 14.4 on CMP  -- Rreated with IVF's, calcitonin, zometa at OSH  -- Patient is awake but confused following simple commands.   -- ionized calcium within normal limits after IV fluids, calcitonin, zoledronic acid

## 2020-12-12 NOTE — ASSESSMENT & PLAN NOTE
Patient is complaining of weakness, unable to walk on her own.  Prior to this acute event., she was fully independent.  [facility]  [facility]. Patient desires SNF.   Right lower leg US to r/o DVT.

## 2020-12-12 NOTE — PLAN OF CARE
POC reviewed with patient; understanding verbalized. Pt remains confused to time and place throughout shift. Pt maintaining sats on room air. Pt voiding via purewick. Pt with nonskid footwear on, bed in lowest position, and locked with bed rails up x2. Pt instructed to call prior to getting OOB. Pt has call light and personal items within reach. VSS and afebrile this shift. All questions and concerns addressed at this time. Will continue to monitor.

## 2020-12-12 NOTE — PROGRESS NOTES
[facility]  Hematology/Oncology  Progress Note    Patient Name: Mickie Pan  Admission Date: 12/9/2020  Hospital Length of Stay: 2 days  Code Status: Full Code     Subjective:     HPI:  Mickie Pan is an 84-year-old female with a PMHx of overactive bladder who presents to [facility] as a transfer from [facility] in [place], [place] for further evaluation for suspected multiple myeloma as well as altered mental status + UTI/Pneumonia.     Pt has been having intermittent right neck pain for the past 6 months; pain worsens in the morning upon awakening & exacerbates with direct contact. Pt also having bilateral arm pain.    Interval History: patient is more alert and oriented x3.  We have discussed the findings with her concerning for multiple myeloma.  She wanted to establish her care here.  Plan on bone marrow biopsy today.     Oncology Treatment Plan:   [No treatment plan]    Medications:  Continuous Infusions:  Scheduled Meds:   aspirin  81 mg Oral Daily    enoxaparin  40 mg Subcutaneous Q24H    levoFLOXacin  750 mg Oral Every other day    mupirocin   Nasal BID     PRN Meds:albuterol-ipratropium, melatonin, ondansetron, sodium chloride 0.9%     Review of Systems   Constitutional: Positive for fatigue. Negative for unexpected weight change.   HENT: Negative.    Eyes: Negative.    Respiratory: Negative for shortness of breath.    Cardiovascular: Negative for chest pain and leg swelling.   Gastrointestinal: Negative.    Musculoskeletal: Negative for back pain, joint swelling and myalgias.   Skin: Negative.    Neurological: Positive for weakness.   Psychiatric/Behavioral: Negative for confusion.     Objective:     Vital Signs (Most Recent):  Temp: 98.1 °F (36.7 °C) (12/11/20 1616)  Pulse: 81 (12/11/20 1616)  Resp: 19 (12/11/20 1616)  BP: 138/65 (12/11/20 1616)  SpO2: 98 % (12/11/20 1616) Vital Signs (24h Range):  Temp:  [97.5 °F (36.4 °C)-98.4 °F (36.9 °C)] 98.1 °F (36.7 °C)  Pulse:   [65-87] 81  Resp:  [16-19] 19  SpO2:  [95 %-98 %] 98 %  BP: (122-173)/() 138/65        There is no height or weight on file to calculate BMI.  There is no height or weight on file to calculate BSA.      Intake/Output Summary (Last 24 hours) at 12/11/2020 1824  Last data filed at 12/11/2020 1404  Gross per 24 hour   Intake 827 ml   Output 350 ml   Net 477 ml       Physical Exam  HENT:      Head: Normocephalic and atraumatic.      Mouth/Throat:      Mouth: Mucous membranes are moist.   Eyes:      Extraocular Movements: Extraocular movements intact.   Cardiovascular:      Rate and Rhythm: Normal rate and regular rhythm.      Pulses: Normal pulses.      Heart sounds: Normal heart sounds.   Pulmonary:      Effort: Pulmonary effort is normal.   Abdominal:      General: Abdomen is flat.      Palpations: Abdomen is soft.   Musculoskeletal:      Right shoulder: She exhibits tenderness.      Right lower leg: No edema.      Left lower leg: No edema.   Skin:     Capillary Refill: Capillary refill takes less than 2 seconds.   Neurological:      General: No focal deficit present.      Mental Status: She is alert. She is disoriented.         Significant Labs:   CBC:   Recent Labs   Lab 12/09/20  2150 12/10/20  0342 12/11/20  0428   WBC 12.52 12.55 10.73   HGB 10.2* 9.8* 10.6*   HCT 31.9* 31.1* 33.1*    302 344    and CMP:   Recent Labs   Lab 12/09/20  2150 12/10/20  0342 12/11/20  0428    144 147*   K 3.8 3.4* 3.5   * 112* 112*   CO2 23 21* 23   GLU 88 94 81   BUN 20 20 21   CREATININE 1.0 1.0 1.1   CALCIUM 9.6 8.9 8.5*   PROT 5.5* 5.0* 5.7*   ALBUMIN 2.9* 2.7* 3.0*   BILITOT 0.4 0.3 0.5   ALKPHOS 62 59 68   AST 21 23 23   ALT 15 14 17   ANIONGAP 9 11 12   EGFRNONAA 52.2* 52.2* 46.5*       Diagnostic Results:  I have reviewed all pertinent imaging results/findings within the past 24 hours.    Assessment/Plan:     * Encephalopathy, metabolic    -- Hypercalcemia and infection likely  contributing    Plan  Resolved    Physical deconditioning  Patient is complaining of weakness, unable to walk on her own.  Prior to this acute event., she was fully independent.  PT/OT w  Disposition pending thier recommendations, home with PT versus rehab    Hypercalcemia  -- Elevated Ca of 14.4 on CMP  -- Rreated with IVF's, calcitonin, zometa at OSH  -- Patient is awake but confused following simple commands.   -- ionized calcium within normal limits after IV fluids, calcitonin, zoledronic acid        UTI (urinary tract infection)  -- Urine cx growing E coli at OSH    Plan  -- IV CTX (today is day 3)  -- repeat urinalysis normal  Will complete antibiotic course with Levaquin every other day starting 12/11/2020.      Pneumonia  Community Acquired Pneumonia  -- Chest Xray: left basilar atelectasis with developing consolidation    Plan:  -- oral Levaquin every other day starting 12/11/2020.  -- Oxygen prn for spo2 <90  -- Duo-nebs q6 hours p.r.n.  -- Acapella and Incentive Spirometer      Multiple myeloma  -- Right shoulder xray: no evidence of fx. Small cortical lucencies suspicious for multiple myeloma.   -- Serum and urine protein electrophoresis collected but are send out labs.   [place] free light chain:  264.28  Lambda free light chain 0.38  Kappa lambda ratio 695.50 (elevated)      Plan:  - bone marrow biopsy was performed today  -.  Patient wants to establish her care here at [facility].  We will schedule outpatient follow-up once she is ready for discharge               Jesus Delvalle MD  Hematology/Oncology  [facility]

## 2020-12-12 NOTE — PROCEDURES
PROCEDURE NOTE:  Date of Procedure: 12/11/2020  Bone Marrow Biopsy and Aspiration  Indication:  Multiple myeloma  Consent: Informed consent was obtained from patient.  Timeout: Done and documented.  Position:  Left lateral decubitus  Site: Left posterior illiac crest  Prep: Betadine.  Needle used: 11 gauge Jamshidi needle.  Anesthetic: 2% lidocaine 5 cc.  Biopsy: The biopsy needle was introduced into the marrow cavity and an aspirate was obtained without complications and sent for flow cytometry, PCPD FISH, DNA/RNA hold and cytogenetics. Core biopsy obtained without difficulty and sent for routine histologic examination.  Complications: None.  Disposition: The patient was left in room with RN and instructed to remain on back for 20 minutes prior to d/c home. Instructed to remove bandaid in 24 hours.  Blood loss: Minimal.       Jesus Delvalle MD  PGY4  Hematology/Oncology fellow

## 2020-12-12 NOTE — PLAN OF CARE
0840 pt lying in bed, awake, alert, oriented, pt voices no complaints at this time, #20 gauge to right wrist dry and intact, saline locked, bed in low position, call light in reach, instructed pt to call for asisstance

## 2020-12-12 NOTE — PROGRESS NOTES
[facility]  Hematology/Oncology  Progress Note    Patient Name: Mickie Pan  Admission Date: 12/9/2020  Hospital Length of Stay: 3 days  Code Status: Full Code     Subjective:     HPI:  Mickie Pan is an 84-year-old female with a PMHx of overactive bladder who presents to [facility] as a transfer from [facility] in [place], [place] for further evaluation for suspected multiple myeloma as well as altered mental status + UTI/Pneumonia.     Pt has been having intermittent right neck pain for the past 6 months; pain worsens in the morning upon awakening & exacerbates with direct contact. Pt also having bilateral arm pain.    Interval History: NAEON. AF VSS. AOx4. No pain after bone marrow biopsy yesterday.     Oncology Treatment Plan:   [No treatment plan]    Medications:  Continuous Infusions:  Scheduled Meds:   aspirin  81 mg Oral Daily    enoxaparin  40 mg Subcutaneous Q24H    levoFLOXacin  750 mg Oral Every other day    mupirocin   Nasal BID     PRN Meds:albuterol-ipratropium, melatonin, ondansetron, sodium chloride 0.9%     Review of Systems   Constitutional: Negative for chills and fever.   HENT: Negative for sore throat.    Respiratory: Negative for cough and shortness of breath.    Cardiovascular: Negative for chest pain and leg swelling.   Gastrointestinal: Negative for abdominal pain.   Genitourinary: Negative for dysuria.   Musculoskeletal: Positive for joint swelling. Negative for back pain and myalgias.   Psychiatric/Behavioral: Negative for confusion.     Objective:     Vital Signs (Most Recent):  Temp: 98.8 °F (37.1 °C) (12/12/20 0833)  Pulse: 80 (12/12/20 0833)  Resp: 18 (12/12/20 0321)  BP: (!) 147/68 (12/12/20 0833)  SpO2: (!) 92 % (12/12/20 0833) Vital Signs (24h Range):  Temp:  [98.1 °F (36.7 °C)-98.8 °F (37.1 °C)] 98.8 °F (37.1 °C)  Pulse:  [80-88] 80  Resp:  [16-19] 18  SpO2:  [92 %-98 %] 92 %  BP: (122-174)/(58-74) 147/68        There is no height or weight on  file to calculate BMI.  There is no height or weight on file to calculate BSA.      Intake/Output Summary (Last 24 hours) at 12/12/2020 1048  Last data filed at 12/12/2020 0446  Gross per 24 hour   Intake 236 ml   Output 950 ml   Net -714 ml       Physical Exam  HENT:      Head: Normocephalic and atraumatic.      Mouth/Throat:      Mouth: Mucous membranes are moist.   Eyes:      Extraocular Movements: Extraocular movements intact.   Cardiovascular:      Rate and Rhythm: Normal rate and regular rhythm.      Pulses: Normal pulses.      Heart sounds: Normal heart sounds.   Pulmonary:      Effort: Pulmonary effort is normal.   Abdominal:      General: Abdomen is flat.      Palpations: Abdomen is soft.   Musculoskeletal:      Right shoulder: She exhibits tenderness.      Right lower leg: No edema.      Left lower leg: No edema.   Skin:     Capillary Refill: Capillary refill takes less than 2 seconds.   Neurological:      General: No focal deficit present.      Mental Status: She is alert. She is disoriented.         Significant Labs:   CBC:   Recent Labs   Lab 12/11/20 0428 12/12/20 0424   WBC 10.73 10.14   HGB 10.6* 10.4*   HCT 33.1* 32.3*    346    and CMP:   Recent Labs   Lab 12/11/20  0428 12/12/20 0424   * 143   K 3.5 3.5   * 108   CO2 23 23   GLU 81 87   BUN 21 22   CREATININE 1.1 1.1   CALCIUM 8.5* 7.6*   PROT 5.7* 5.5*   ALBUMIN 3.0* 2.8*   BILITOT 0.5 0.4   ALKPHOS 68 65   AST 23 25   ALT 17 18   ANIONGAP 12 12   EGFRNONAA 46.5* 46.5*       Diagnostic Results:  I have reviewed all pertinent imaging results/findings within the past 24 hours.    Assessment/Plan:     * Encephalopathy, metabolic    -- Hypercalcemia and infection likely contributing    Plan  Resolved    Physical deconditioning  Patient is complaining of weakness, unable to walk on her own.  Prior to this acute event., she was fully independent.  PT/OT w  Disposition pending thier recommendations: will attempt SNF placement.  Patient desires SNF.   Right lower leg US to r/o DVT.     Hypercalcemia  -- Elevated Ca of 14.4 on CMP  -- Rreated with IVF's, calcitonin, zometa at OSH  -- Patient is awake but confused following simple commands.   -- ionized calcium within normal limits after IV fluids, calcitonin, zoledronic acid  -Ca 7.6      UTI (urinary tract infection)  -- Urine cx growing E coli at OSH    Plan  -- IV CTX (today is day 3)  -- repeat urinalysis normal  Will complete antibiotic course with Levaquin every other day starting 12/11/2020.  Last dose 12/13.       Pneumonia  Community Acquired Pneumonia  -- Chest Xray: left basilar atelectasis with developing consolidation    Plan:  -- oral Levaquin every other day starting 12/11/2020.  -- Oxygen prn for spo2 <90  -- Duo-nebs q6 hours p.r.n.  -- Acapella and Incentive Spirometer  Last dose of Levaquin 12/13.       Multiple myeloma  -- Right shoulder xray: no evidence of fx. Small cortical lucencies suspicious for multiple myeloma.   -- Serum and urine protein electrophoresis collected but are send out labs.   [place] free light chain:  264.28  Lambda free light chain 0.38  Kappa lambda ratio 695.50 (elevated)      Plan:  - bone marrow biopsy was performed 12/11. Awaiting results.   -.  Patient wants to establish her care here at [facility].  We will schedule outpatient follow-up once she is ready for discharge               Mango Black MD  Hematology/Oncology  [facility]

## 2020-12-12 NOTE — ASSESSMENT & PLAN NOTE
-- Right shoulder xray: no evidence of fx. Small cortical lucencies suspicious for multiple myeloma.   -- Serum and urine protein electrophoresis collected but are send out labs.   [place] free light chain:  264.28  Lambda free light chain 0.38  Kappa lambda ratio 695.50 (elevated)      Plan:  - bone marrow biopsy was performed 12/11. Awaiting results.   -.  Patient wants to establish her care here at [facility].  We will schedule outpatient follow-up once she is ready for discharge

## 2020-12-12 NOTE — ASSESSMENT & PLAN NOTE
-- Right shoulder xray: no evidence of fx. Small cortical lucencies suspicious for multiple myeloma.   -- Serum and urine protein electrophoresis collected but are send out labs.   [place] free light chain:  264.28  Lambda free light chain 0.38  Kappa lambda ratio 695.50 (elevated)      Plan:  - bone marrow biopsy was performed today  -.  Patient wants to establish her care here at [facility].  We will schedule outpatient follow-up once she is ready for discharge

## 2020-12-12 NOTE — ASSESSMENT & PLAN NOTE
Community Acquired Pneumonia  -- Chest Xray: left basilar atelectasis with developing consolidation    Plan:  -- oral Levaquin every other day starting 12/11/2020.  -- Oxygen prn for spo2 <90  -- Duo-nebs q6 hours p.r.n.  -- Acapella and Incentive Spirometer

## 2020-12-13 LAB
ALBUMIN SERPL BCP-MCNC: 2.7 G/DL (ref 3.5–5.2)
ALP SERPL-CCNC: 61 U/L (ref 55–135)
ALT SERPL W/O P-5'-P-CCNC: 17 U/L (ref 10–44)
ANION GAP SERPL CALC-SCNC: 8 MMOL/L (ref 8–16)
AST SERPL-CCNC: 25 U/L (ref 10–40)
BASOPHILS # BLD AUTO: 0.04 K/UL (ref 0–0.2)
BASOPHILS NFR BLD: 0.4 % (ref 0–1.9)
BILIRUB SERPL-MCNC: 0.5 MG/DL (ref 0.1–1)
BUN SERPL-MCNC: 18 MG/DL (ref 8–23)
CALCIUM SERPL-MCNC: 7.6 MG/DL (ref 8.7–10.5)
CHLORIDE SERPL-SCNC: 109 MMOL/L (ref 95–110)
CO2 SERPL-SCNC: 27 MMOL/L (ref 23–29)
CREAT SERPL-MCNC: 0.8 MG/DL (ref 0.5–1.4)
DIFFERENTIAL METHOD: ABNORMAL
EOSINOPHIL # BLD AUTO: 0.2 K/UL (ref 0–0.5)
EOSINOPHIL NFR BLD: 2 % (ref 0–8)
ERYTHROCYTE [DISTWIDTH] IN BLOOD BY AUTOMATED COUNT: 13.2 % (ref 11.5–14.5)
EST. GFR  (AFRICAN AMERICAN): >60 ML/MIN/1.73 M^2
EST. GFR  (NON AFRICAN AMERICAN): >60 ML/MIN/1.73 M^2
GLUCOSE SERPL-MCNC: 86 MG/DL (ref 70–110)
HCT VFR BLD AUTO: 31.5 % (ref 37–48.5)
HGB BLD-MCNC: 10.1 G/DL (ref 12–16)
IMM GRANULOCYTES # BLD AUTO: 0.13 K/UL (ref 0–0.04)
IMM GRANULOCYTES NFR BLD AUTO: 1.3 % (ref 0–0.5)
LYMPHOCYTES # BLD AUTO: 1.3 K/UL (ref 1–4.8)
LYMPHOCYTES NFR BLD: 12.2 % (ref 18–48)
MAGNESIUM SERPL-MCNC: 2 MG/DL (ref 1.6–2.6)
MCH RBC QN AUTO: 31.8 PG (ref 27–31)
MCHC RBC AUTO-ENTMCNC: 32.1 G/DL (ref 32–36)
MCV RBC AUTO: 99 FL (ref 82–98)
MONOCYTES # BLD AUTO: 0.8 K/UL (ref 0.3–1)
MONOCYTES NFR BLD: 8.2 % (ref 4–15)
NEUTROPHILS # BLD AUTO: 7.8 K/UL (ref 1.8–7.7)
NEUTROPHILS NFR BLD: 75.9 % (ref 38–73)
NRBC BLD-RTO: 0 /100 WBC
PHOSPHATE SERPL-MCNC: 2.2 MG/DL (ref 2.7–4.5)
PHOSPHATE SERPL-MCNC: 2.2 MG/DL (ref 2.7–4.5)
PLATELET # BLD AUTO: 341 K/UL (ref 150–350)
PMV BLD AUTO: 10.3 FL (ref 9.2–12.9)
POTASSIUM SERPL-SCNC: 2.9 MMOL/L (ref 3.5–5.1)
PROT SERPL-MCNC: 5.2 G/DL (ref 6–8.4)
RBC # BLD AUTO: 3.18 M/UL (ref 4–5.4)
SODIUM SERPL-SCNC: 144 MMOL/L (ref 136–145)
WBC # BLD AUTO: 10.26 K/UL (ref 3.9–12.7)

## 2020-12-13 PROCEDURE — 94664 DEMO&/EVAL PT USE INHALER: CPT

## 2020-12-13 PROCEDURE — 25000003 PHARM REV CODE 250: Performed by: INTERNAL MEDICINE

## 2020-12-13 PROCEDURE — 63600175 PHARM REV CODE 636 W HCPCS: Performed by: STUDENT IN AN ORGANIZED HEALTH CARE EDUCATION/TRAINING PROGRAM

## 2020-12-13 PROCEDURE — 25000003 PHARM REV CODE 250: Performed by: STUDENT IN AN ORGANIZED HEALTH CARE EDUCATION/TRAINING PROGRAM

## 2020-12-13 PROCEDURE — 99233 PR SUBSEQUENT HOSPITAL CARE,LEVL III: ICD-10-PCS | Mod: GC,,, | Performed by: INTERNAL MEDICINE

## 2020-12-13 PROCEDURE — 36415 COLL VENOUS BLD VENIPUNCTURE: CPT

## 2020-12-13 PROCEDURE — 80053 COMPREHEN METABOLIC PANEL: CPT

## 2020-12-13 PROCEDURE — 99233 SBSQ HOSP IP/OBS HIGH 50: CPT | Mod: GC,,, | Performed by: INTERNAL MEDICINE

## 2020-12-13 PROCEDURE — 83735 ASSAY OF MAGNESIUM: CPT

## 2020-12-13 PROCEDURE — 99900035 HC TECH TIME PER 15 MIN (STAT)

## 2020-12-13 PROCEDURE — 84100 ASSAY OF PHOSPHORUS: CPT

## 2020-12-13 PROCEDURE — 27000646 HC AEROBIKA DEVICE

## 2020-12-13 PROCEDURE — 94799 UNLISTED PULMONARY SVC/PX: CPT

## 2020-12-13 PROCEDURE — 20600001 HC STEP DOWN PRIVATE ROOM

## 2020-12-13 PROCEDURE — 94761 N-INVAS EAR/PLS OXIMETRY MLT: CPT

## 2020-12-13 PROCEDURE — 85025 COMPLETE CBC W/AUTO DIFF WBC: CPT

## 2020-12-13 RX ORDER — ENOXAPARIN SODIUM 100 MG/ML
100 INJECTION SUBCUTANEOUS
Status: DISCONTINUED | OUTPATIENT
Start: 2020-12-13 | End: 2020-12-15 | Stop reason: HOSPADM

## 2020-12-13 RX ORDER — SODIUM,POTASSIUM PHOSPHATES 280-250MG
2 POWDER IN PACKET (EA) ORAL
Status: COMPLETED | OUTPATIENT
Start: 2020-12-13 | End: 2020-12-13

## 2020-12-13 RX ORDER — POTASSIUM CHLORIDE 750 MG/1
50 CAPSULE, EXTENDED RELEASE ORAL ONCE
Status: COMPLETED | OUTPATIENT
Start: 2020-12-13 | End: 2020-12-13

## 2020-12-13 RX ADMIN — MUPIROCIN: 20 OINTMENT TOPICAL at 09:12

## 2020-12-13 RX ADMIN — POTASSIUM CHLORIDE 50 MEQ: 10 CAPSULE, COATED, EXTENDED RELEASE ORAL at 09:12

## 2020-12-13 RX ADMIN — LEVOFLOXACIN 750 MG: 500 TABLET, FILM COATED ORAL at 09:12

## 2020-12-13 RX ADMIN — ENOXAPARIN SODIUM 100 MG: 100 INJECTION SUBCUTANEOUS at 04:12

## 2020-12-13 RX ADMIN — POTASSIUM & SODIUM PHOSPHATES POWDER PACK 280-160-250 MG 2 PACKET: 280-160-250 PACK at 03:12

## 2020-12-13 RX ADMIN — ASPIRIN 81 MG: 81 TABLET, COATED ORAL at 09:12

## 2020-12-13 RX ADMIN — POTASSIUM & SODIUM PHOSPHATES POWDER PACK 280-160-250 MG 2 PACKET: 280-160-250 PACK at 11:12

## 2020-12-13 RX ADMIN — POTASSIUM & SODIUM PHOSPHATES POWDER PACK 280-160-250 MG 2 PACKET: 280-160-250 PACK at 08:12

## 2020-12-13 NOTE — PROGRESS NOTES
[facility]  Hematology/Oncology  Progress Note    Patient Name: Mickie Pan  Admission Date: 12/9/2020  Hospital Length of Stay: 4 days  Code Status: Full Code     Subjective:     HPI:  Mickie Pan is an 84-year-old female with a PMHx of overactive bladder who presents to [facility] as a transfer from [facility] in [place], [place] for further evaluation for suspected multiple myeloma as well as altered mental status + UTI/Pneumonia.     Pt has been having intermittent right neck pain for the past 6 months; pain worsens in the morning upon awakening & exacerbates with direct contact. Pt also having bilateral arm pain.    Interval History: NAEON. AF VSS. AOx4. Awaiting SNF placement.     Oncology Treatment Plan:   [No treatment plan]    Medications:  Continuous Infusions:  Scheduled Meds:   aspirin  81 mg Oral Daily    enoxaparin  40 mg Subcutaneous Q24H    levoFLOXacin  750 mg Oral Every other day    mupirocin   Nasal BID    potassium, sodium phosphates  2 packet Oral Q4H     PRN Meds:albuterol-ipratropium, melatonin, ondansetron, sodium chloride 0.9%     Review of Systems   Constitutional: Negative for chills and fever.   HENT: Negative for sore throat.    Respiratory: Negative for cough and shortness of breath.    Cardiovascular: Negative for chest pain and leg swelling.   Gastrointestinal: Negative for abdominal pain.   Genitourinary: Negative for dysuria.   Musculoskeletal: Positive for joint swelling. Negative for back pain and myalgias.   Psychiatric/Behavioral: Negative for confusion.     Objective:     Vital Signs (Most Recent):  Temp: 98.8 °F (37.1 °C) (12/13/20 1144)  Pulse: 82 (12/13/20 1144)  Resp: 17 (12/13/20 1144)  BP: (!) 117/57 (12/13/20 1144)  SpO2: 95 % (12/13/20 1144) Vital Signs (24h Range):  Temp:  [98.3 °F (36.8 °C)-99.4 °F (37.4 °C)] 98.8 °F (37.1 °C)  Pulse:  [77-83] 82  Resp:  [16-20] 17  SpO2:  [93 %-95 %] 95 %  BP: (117-171)/(57-82) 117/57        Body  mass index is 27.31 kg/m².  Body surface area is 1.72 meters squared.      Intake/Output Summary (Last 24 hours) at 12/13/2020 1224  Last data filed at 12/13/2020 0334  Gross per 24 hour   Intake 540 ml   Output 1050 ml   Net -510 ml       Physical Exam  HENT:      Head: Normocephalic and atraumatic.      Mouth/Throat:      Mouth: Mucous membranes are moist.   Eyes:      Extraocular Movements: Extraocular movements intact.   Cardiovascular:      Rate and Rhythm: Normal rate and regular rhythm.      Pulses: Normal pulses.      Heart sounds: Normal heart sounds.   Pulmonary:      Effort: Pulmonary effort is normal.   Abdominal:      General: Abdomen is flat.      Palpations: Abdomen is soft.   Musculoskeletal:      Left wrist: She exhibits swelling.      Right lower leg: No edema.      Left lower leg: No edema.   Skin:     Capillary Refill: Capillary refill takes less than 2 seconds.   Neurological:      General: No focal deficit present.      Mental Status: She is alert and oriented to person, place, and time. Mental status is at baseline.         Significant Labs:   CBC:   Recent Labs   Lab 12/12/20  0424 12/13/20  0538   WBC 10.14 10.26   HGB 10.4* 10.1*   HCT 32.3* 31.5*    341    and CMP:   Recent Labs   Lab 12/12/20  0424 12/13/20  0538    144   K 3.5 2.9*    109   CO2 23 27   GLU 87 86   BUN 22 18   CREATININE 1.1 0.8   CALCIUM 7.6* 7.6*   PROT 5.5* 5.2*   ALBUMIN 2.8* 2.7*   BILITOT 0.4 0.5   ALKPHOS 65 61   AST 25 25   ALT 18 17   ANIONGAP 12 8   EGFRNONAA 46.5* >60.0       Diagnostic Results:  I have reviewed all pertinent imaging results/findings within the past 24 hours.    Assessment/Plan:     * Encephalopathy, metabolic    -- Hypercalcemia and infection likely contributing    Plan  Resolved    Physical deconditioning  Patient is complaining of weakness, unable to walk on her own.  Prior to this acute event., she was fully independent.  PT/OT w  Disposition pending [facility]  recommendations: will attempt SNF placement. Patient desires SNF.   Lower extremitiy US to r/o DVT.     Hypercalcemia  -- Elevated Ca of 14.4 on CMP  -- Rreated with IVF's, calcitonin, zometa at OSH  -- Patient is awake but confused following simple commands.   -- ionized calcium within normal limits after IV fluids, calcitonin, zoledronic acid  -Ca 7.6      UTI (urinary tract infection)  -- Urine cx growing E coli at OSH    Plan  -- IV CTX (today is day 3)  -- repeat urinalysis normal  --Completed course of levaquin on 12/13.      Pneumonia  Community Acquired Pneumonia  -- Chest Xray: left basilar atelectasis with developing consolidation    Plan:  -- oral Levaquin every other day starting 12/11/2020.  -- Oxygen prn for spo2 <90  -- Duo-nebs q6 hours p.r.n.  -- Acapella and Incentive Spirometer  -- Completed course of levaquin on 12/13.      Multiple myeloma  -- Right shoulder xray: no evidence of fx. Small cortical lucencies suspicious for multiple myeloma.   -- Serum and urine protein electrophoresis collected but are send out labs.   [employer] free light chain:  264.28  Lambda free light chain 0.38  Kappa lambda ratio 695.50 (elevated)      Plan:  - bone marrow biopsy was performed 12/11. Awaiting results.   -.  Patient wants to establish her care here at [facility].  We will schedule outpatient follow-up once she is ready for discharge               Mango Black MD  Hematology/Oncology  [facility]

## 2020-12-13 NOTE — PLAN OF CARE
POC reviewed with patient; understanding verbalized. MARISOL. Pt maintaining sats on room air. Pt voiding via purewick. Pt with nonskid footwear on, bed in lowest position, and locked with bed rails up x2. Pt instructed to call prior to getting OOB. Pt has call light and personal items within reach. VSS and afebrile this shift. All questions and concerns addressed at this time. Will continue to monitor.

## 2020-12-13 NOTE — SUBJECTIVE & OBJECTIVE
Interval History: NAEON. AF VSS. AOx4. Awaiting SNF placement.     Oncology Treatment Plan:   [No treatment plan]    Medications:  Continuous Infusions:  Scheduled Meds:   aspirin  81 mg Oral Daily    enoxaparin  40 mg Subcutaneous Q24H    levoFLOXacin  750 mg Oral Every other day    mupirocin   Nasal BID    potassium, sodium phosphates  2 packet Oral Q4H     PRN Meds:albuterol-ipratropium, melatonin, ondansetron, sodium chloride 0.9%     Review of Systems   Constitutional: Negative for chills and fever.   HENT: Negative for sore throat.    Respiratory: Negative for cough and shortness of breath.    Cardiovascular: Negative for chest pain and leg swelling.   Gastrointestinal: Negative for abdominal pain.   Genitourinary: Negative for dysuria.   Musculoskeletal: Positive for joint swelling. Negative for back pain and myalgias.   Psychiatric/Behavioral: Negative for confusion.     Objective:     Vital Signs (Most Recent):  Temp: 98.8 °F (37.1 °C) (12/13/20 1144)  Pulse: 82 (12/13/20 1144)  Resp: 17 (12/13/20 1144)  BP: (!) 117/57 (12/13/20 1144)  SpO2: 95 % (12/13/20 1144) Vital Signs (24h Range):  Temp:  [98.3 °F (36.8 °C)-99.4 °F (37.4 °C)] 98.8 °F (37.1 °C)  Pulse:  [77-83] 82  Resp:  [16-20] 17  SpO2:  [93 %-95 %] 95 %  BP: (117-171)/(57-82) 117/57        Body mass index is 27.31 kg/m².  Body surface area is 1.72 meters squared.      Intake/Output Summary (Last 24 hours) at 12/13/2020 1224  Last data filed at 12/13/2020 0334  Gross per 24 hour   Intake 540 ml   Output 1050 ml   Net -510 ml       Physical Exam  HENT:      Head: Normocephalic and atraumatic.      Mouth/Throat:      Mouth: Mucous membranes are moist.   Eyes:      Extraocular Movements: Extraocular movements intact.   Cardiovascular:      Rate and Rhythm: Normal rate and regular rhythm.      Pulses: Normal pulses.      Heart sounds: Normal heart sounds.   Pulmonary:      Effort: Pulmonary effort is normal.   Abdominal:      General: Abdomen is  flat.      Palpations: Abdomen is soft.   Musculoskeletal:      Left wrist: She exhibits swelling.      Right lower leg: No edema.      Left lower leg: No edema.   Skin:     Capillary Refill: Capillary refill takes less than 2 seconds.   Neurological:      General: No focal deficit present.      Mental Status: She is alert and oriented to person, place, and time. Mental status is at baseline.         Significant Labs:   CBC:   Recent Labs   Lab 12/12/20  0424 12/13/20  0538   WBC 10.14 10.26   HGB 10.4* 10.1*   HCT 32.3* 31.5*    341    and CMP:   Recent Labs   Lab 12/12/20 0424 12/13/20  0538    144   K 3.5 2.9*    109   CO2 23 27   GLU 87 86   BUN 22 18   CREATININE 1.1 0.8   CALCIUM 7.6* 7.6*   PROT 5.5* 5.2*   ALBUMIN 2.8* 2.7*   BILITOT 0.4 0.5   ALKPHOS 65 61   AST 25 25   ALT 18 17   ANIONGAP 12 8   EGFRNONAA 46.5* >60.0       Diagnostic Results:  I have reviewed all pertinent imaging results/findings within the past 24 hours.

## 2020-12-13 NOTE — ASSESSMENT & PLAN NOTE
-- Urine cx growing E coli at OSH    Plan  -- IV CTX (today is day 3)  -- repeat urinalysis normal  --Completed course of levaquin on 12/13.

## 2020-12-13 NOTE — PLAN OF CARE
Patient AAOx4. No complaints this shift. US of lower extremities completed this shift for DVT rule out. Electrolytes replaced. Awaiting BMB results. 1 BM this shift. Bed in low locked position, call light in reach. Instructed to call if needed.

## 2020-12-13 NOTE — ASSESSMENT & PLAN NOTE
Community Acquired Pneumonia  -- Chest Xray: left basilar atelectasis with developing consolidation    Plan:  -- oral Levaquin every other day starting 12/11/2020.  -- Oxygen prn for spo2 <90  -- Duo-nebs q6 hours p.r.n.  -- Acapella and Incentive Spirometer  -- Completed course of levaquin on 12/13.

## 2020-12-13 NOTE — ASSESSMENT & PLAN NOTE
Patient is complaining of weakness, unable to walk on her own.  Prior to this acute event., she was fully independent.  PT/OT w  Disposition pending thier recommendations: will attempt SNF placement. Patient desires SNF.   Lower extremitiy US to r/o DVT.

## 2020-12-13 NOTE — PROGRESS NOTES
[facility]  Hematology/Oncology  Progress Note    Patient Name: Mickie Pan  Admission Date: 12/9/2020  Hospital Length of Stay: 4 days  Code Status: Full Code     Subjective:     HPI:  Mickie Pan is an 84-year-old female with a PMHx of overactive bladder who presents to [facility] as a transfer from [facility] in [place], [place] for further evaluation for suspected multiple myeloma as well as altered mental status + UTI/Pneumonia.     Pt has been having intermittent right neck pain for the past 6 months; pain worsens in the morning upon awakening & exacerbates with direct contact. Pt also having bilateral arm pain.    No new subjective & objective note has been filed under this hospital service since the last note was generated.    Assessment/Plan:     * Encephalopathy, metabolic    -- Hypercalcemia and infection likely contributing    Plan  Resolved    Physical deconditioning  Patient is complaining of weakness, unable to walk on her own.  Prior to this acute event., she was fully independent.  PT/OT w  Disposition pending thier recommendations: will attempt SNF placement. Patient desires SNF.   Lower extremitiy US to r/o DVT.     Hypercalcemia  -- Elevated Ca of 14.4 on CMP  -- Rreated with IVF's, calcitonin, zometa at OSH  -- Patient is awake but confused following simple commands.   -- ionized calcium within normal limits after IV fluids, calcitonin, zoledronic acid  -Ca 7.6      UTI (urinary tract infection)  -- Urine cx growing E coli at OSH    Plan  -- IV CTX (today is day 3)  -- repeat urinalysis normal  --Completed course of levaquin on 12/13.      Pneumonia  Community Acquired Pneumonia  -- Chest Xray: left basilar atelectasis with developing consolidation    Plan:  -- oral Levaquin every other day starting 12/11/2020.  -- Oxygen prn for spo2 <90  -- Duo-nebs q6 hours p.r.n.  -- Acapella and Incentive Spirometer  -- Completed course of levaquin on 12/13.      Multiple  myeloma  -- Right shoulder xray: no evidence of fx. Small cortical lucencies suspicious for multiple myeloma.   -- Serum and urine protein electrophoresis collected but are send out labs.   [place] free light chain:  264.28  Lambda free light chain 0.38  Kappa lambda ratio 695.50 (elevated)      Plan:  - bone marrow biopsy was performed 12/11. Awaiting results.   -.  Patient wants to establish her care here at [facility].  We will schedule outpatient follow-up once she is ready for discharge               Mango Black MD  Hematology/Oncology  [facility]

## 2020-12-13 NOTE — PLAN OF CARE
1635 pt had uneventful shift, no complaints of pain this shift, pt awake, alert , oriented throughout shift, call light in reach

## 2020-12-13 NOTE — ASSESSMENT & PLAN NOTE
-- Right shoulder xray: no evidence of fx. Small cortical lucencies suspicious for multiple myeloma.   -- Serum and urine protein electrophoresis collected but are send out labs.   [place] free light chain:  264.28  Lambda free light chain 0.38  Kappa lambda ratio 695.50 (elevated)      Plan:  - bone marrow biopsy was performed 12/11. Awaiting results.   -.  Patient wants to establish her care here at [facility].  We will schedule outpatient follow-up once she is ready for discharge

## 2020-12-14 LAB
ALBUMIN SERPL BCP-MCNC: 2.5 G/DL (ref 3.5–5.2)
ALP SERPL-CCNC: 60 U/L (ref 55–135)
ALT SERPL W/O P-5'-P-CCNC: 19 U/L (ref 10–44)
ANION GAP SERPL CALC-SCNC: 11 MMOL/L (ref 8–16)
AST SERPL-CCNC: 25 U/L (ref 10–40)
BASOPHILS # BLD AUTO: 0.05 K/UL (ref 0–0.2)
BASOPHILS NFR BLD: 0.5 % (ref 0–1.9)
BILIRUB SERPL-MCNC: 0.5 MG/DL (ref 0.1–1)
BODY SITE - BONE MARROW: NORMAL
BUN SERPL-MCNC: 17 MG/DL (ref 8–23)
CALCIUM SERPL-MCNC: 7 MG/DL (ref 8.7–10.5)
CHLORIDE SERPL-SCNC: 111 MMOL/L (ref 95–110)
CLINICAL DIAGNOSIS - BONE MARROW: NORMAL
CO2 SERPL-SCNC: 23 MMOL/L (ref 23–29)
CREAT SERPL-MCNC: 1.1 MG/DL (ref 0.5–1.4)
DIFFERENTIAL METHOD: ABNORMAL
EOSINOPHIL # BLD AUTO: 0.3 K/UL (ref 0–0.5)
EOSINOPHIL NFR BLD: 2.9 % (ref 0–8)
ERYTHROCYTE [DISTWIDTH] IN BLOOD BY AUTOMATED COUNT: 13.6 % (ref 11.5–14.5)
EST. GFR  (AFRICAN AMERICAN): 53.3 ML/MIN/1.73 M^2
EST. GFR  (NON AFRICAN AMERICAN): 46.2 ML/MIN/1.73 M^2
FLOW CYTOMETRY ANTIBODIES ANALYZED - BONE MARROW: NORMAL
FLOW CYTOMETRY COMMENT - BONE MARROW: NORMAL
FLOW CYTOMETRY INTERPRETATION - BONE MARROW: NORMAL
GLUCOSE SERPL-MCNC: 101 MG/DL (ref 70–110)
HCT VFR BLD AUTO: 31.6 % (ref 37–48.5)
HGB BLD-MCNC: 10 G/DL (ref 12–16)
IMM GRANULOCYTES # BLD AUTO: 0.17 K/UL (ref 0–0.04)
IMM GRANULOCYTES NFR BLD AUTO: 1.6 % (ref 0–0.5)
LYMPHOCYTES # BLD AUTO: 1.4 K/UL (ref 1–4.8)
LYMPHOCYTES NFR BLD: 13.5 % (ref 18–48)
MAGNESIUM SERPL-MCNC: 1.9 MG/DL (ref 1.6–2.6)
MCH RBC QN AUTO: 31.8 PG (ref 27–31)
MCHC RBC AUTO-ENTMCNC: 31.6 G/DL (ref 32–36)
MCV RBC AUTO: 101 FL (ref 82–98)
MONOCYTES # BLD AUTO: 1 K/UL (ref 0.3–1)
MONOCYTES NFR BLD: 9.2 % (ref 4–15)
NEUTROPHILS # BLD AUTO: 7.6 K/UL (ref 1.8–7.7)
NEUTROPHILS NFR BLD: 72.3 % (ref 38–73)
NRBC BLD-RTO: 0 /100 WBC
PHOSPHATE SERPL-MCNC: 3 MG/DL (ref 2.7–4.5)
PHOSPHATE SERPL-MCNC: 3 MG/DL (ref 2.7–4.5)
PLATELET # BLD AUTO: 341 K/UL (ref 150–350)
PMV BLD AUTO: 10.7 FL (ref 9.2–12.9)
POTASSIUM SERPL-SCNC: 3.9 MMOL/L (ref 3.5–5.1)
PROT SERPL-MCNC: 5.2 G/DL (ref 6–8.4)
RBC # BLD AUTO: 3.14 M/UL (ref 4–5.4)
SODIUM SERPL-SCNC: 145 MMOL/L (ref 136–145)
WBC # BLD AUTO: 10.52 K/UL (ref 3.9–12.7)

## 2020-12-14 PROCEDURE — 80053 COMPREHEN METABOLIC PANEL: CPT

## 2020-12-14 PROCEDURE — 99233 SBSQ HOSP IP/OBS HIGH 50: CPT | Mod: GC,,, | Performed by: INTERNAL MEDICINE

## 2020-12-14 PROCEDURE — 20600001 HC STEP DOWN PRIVATE ROOM

## 2020-12-14 PROCEDURE — 25000003 PHARM REV CODE 250: Performed by: STUDENT IN AN ORGANIZED HEALTH CARE EDUCATION/TRAINING PROGRAM

## 2020-12-14 PROCEDURE — 94761 N-INVAS EAR/PLS OXIMETRY MLT: CPT

## 2020-12-14 PROCEDURE — 84100 ASSAY OF PHOSPHORUS: CPT

## 2020-12-14 PROCEDURE — 36415 COLL VENOUS BLD VENIPUNCTURE: CPT

## 2020-12-14 PROCEDURE — 94668 MNPJ CHEST WALL SBSQ: CPT

## 2020-12-14 PROCEDURE — 83735 ASSAY OF MAGNESIUM: CPT

## 2020-12-14 PROCEDURE — 63600175 PHARM REV CODE 636 W HCPCS: Performed by: STUDENT IN AN ORGANIZED HEALTH CARE EDUCATION/TRAINING PROGRAM

## 2020-12-14 PROCEDURE — 99233 PR SUBSEQUENT HOSPITAL CARE,LEVL III: ICD-10-PCS | Mod: GC,,, | Performed by: INTERNAL MEDICINE

## 2020-12-14 PROCEDURE — 97116 GAIT TRAINING THERAPY: CPT

## 2020-12-14 PROCEDURE — 85025 COMPLETE CBC W/AUTO DIFF WBC: CPT

## 2020-12-14 RX ADMIN — MUPIROCIN: 20 OINTMENT TOPICAL at 08:12

## 2020-12-14 RX ADMIN — ASPIRIN 81 MG: 81 TABLET, COATED ORAL at 08:12

## 2020-12-14 RX ADMIN — ENOXAPARIN SODIUM 100 MG: 100 INJECTION SUBCUTANEOUS at 04:12

## 2020-12-14 NOTE — ASSESSMENT & PLAN NOTE
-- Right shoulder xray: no evidence of fx. Small cortical lucencies suspicious for multiple myeloma.   -- Serum and urine protein electrophoresis collected but are send out labs.   [place] free light chain:  264.28  Lambda free light chain 0.38  Kappa lambda ratio 695.50 (elevated)      Plan:  - bone marrow biopsy was performed 12/11. Awaiting results.   -.  Patient wants to establish her care here at [facility].  We will schedule outpatient follow-up once she is ready for discharge

## 2020-12-14 NOTE — PROGRESS NOTES
SW had a 30 minute bedside visit with pt.  Pt has expressed concern about her potential hospital bill.  Sw called pt acct services from pt's room on speaker phone.  Pt has no outstanding bills right now.  Pt was provided with the number to pt acct services and [phone] and encouraged not to worry about the bill before she even finds out what ins will cover.  Sw also reviewed [facility] with pt and she thinks she will qualify.  Sw sent request for vetting for FA to [employer] [facility].  Sw answered all pt's questions regarding her d/c plan.  She verbalized relief and gratitude to be referred to [facility] and said she knows the place well.    Later--Sw called Amanda Rivera [phone].  She said [facility] is still waiting for auth from pt ins.  Pt's son informed. SW will continue to follow.

## 2020-12-14 NOTE — PROGRESS NOTES
[facility]  Hematology/Oncology  Progress Note    Patient Name: Mickie Pan  Admission Date: 12/9/2020  Hospital Length of Stay: 5 days  Code Status: Full Code     Subjective:     HPI:  Mickie Pan is an 84-year-old female with a PMHx of overactive bladder who presents to [facility] [place] transfer from [facility] in [place], [place] for further evaluation for suspected multiple myeloma as well as altered mental status + UTI/Pneumonia.     Pt has been having intermittent right neck pain for the past 6 months; pain worsens in the morning upon awakening & exacerbates with direct contact. Pt also having bilateral arm pain.    Interval History: NAEON. Awaiting SNF authorization.    Oncology Treatment Plan:   [No treatment plan]    Medications:  Continuous Infusions:  Scheduled Meds:   aspirin  81 mg Oral Daily    enoxaparin  100 mg Subcutaneous Q24H    mupirocin   Nasal BID     PRN Meds:albuterol-ipratropium, melatonin, ondansetron, sodium chloride 0.9%     Review of Systems   Constitutional: Negative for chills and fever.   HENT: Negative for sore throat.    Respiratory: Negative for cough and shortness of breath.    Cardiovascular: Negative for chest pain and leg swelling.   Gastrointestinal: Negative for abdominal pain.   Genitourinary: Negative for dysuria.   Musculoskeletal: Positive for joint swelling. Negative for back pain and myalgias.   Psychiatric/Behavioral: Negative for confusion.     Objective:     Vital Signs (Most Recent):  Temp: 99.2 °F (37.3 °C) (12/14/20 1116)  Pulse: 79 (12/14/20 1116)  Resp: 16 (12/14/20 1116)  BP: (!) 153/67 (12/14/20 1116)  SpO2: (!) 94 % (12/14/20 1116) Vital Signs (24h Range):  Temp:  [98 °F (36.7 °C)-99.2 °F (37.3 °C)] 99.2 °F (37.3 °C)  Pulse:  [76-87] 79  Resp:  [16-19] 16  SpO2:  [93 %-97 %] 94 %  BP: (130-153)/(61-72) 153/67     Weight: 67.7 kg (149 lb 4.7 oz)  Body mass index is 29.16 kg/m².  Body surface area is 1.69 meters  squared.      Intake/Output Summary (Last 24 hours) at 12/14/2020 1450  Last data filed at 12/14/2020 1300  Gross per 24 hour   Intake 480 ml   Output 500 ml   Net -20 ml       Physical Exam  HENT:      Head: Normocephalic and atraumatic.      Mouth/Throat:      Mouth: Mucous membranes are moist.   Eyes:      Extraocular Movements: Extraocular movements intact.   Cardiovascular:      Rate and Rhythm: Normal rate and regular rhythm.      Pulses: Normal pulses.      Heart sounds: Normal heart sounds.   Pulmonary:      Effort: Pulmonary effort is normal.   Abdominal:      General: Abdomen is flat.      Palpations: Abdomen is soft.   Musculoskeletal:      Left wrist: She exhibits swelling.      Right lower leg: No edema.      Left lower leg: No edema.   Skin:     Capillary Refill: Capillary refill takes less than 2 seconds.   Neurological:      General: No focal deficit present.      Mental Status: She is alert and oriented to person, place, and time. Mental status is at baseline.         Significant Labs:   CBC:   Recent Labs   Lab 12/13/20  0538 12/14/20  0455   WBC 10.26 10.52   HGB 10.1* 10.0*   HCT 31.5* 31.6*    341   , CMP:   Recent Labs   Lab 12/13/20  0538 12/14/20  0455    145   K 2.9* 3.9    111*   CO2 27 23   GLU 86 101   BUN 18 17   CREATININE 0.8 1.1   CALCIUM 7.6* 7.0*   PROT 5.2* 5.2*   ALBUMIN 2.7* 2.5*   BILITOT 0.5 0.5   ALKPHOS 61 60   AST 25 25   ALT 17 19   ANIONGAP 8 11   EGFRNONAA >60.0 46.2*    and Coagulation: No results for input(s): PT, INR, APTT in the last 48 hours.    Diagnostic Results:  I have reviewed all pertinent imaging results/findings within the past 24 hours.    Assessment/Plan:     * Encephalopathy, metabolic    -- Hypercalcemia and infection likely contributing    Plan  Resolved    Physical deconditioning  Patient is complaining of weakness, unable to walk on her own.  Prior to this acute event., she was fully independent.  PT/OT w  Disposition pending [facility]  recommendations: will attempt SNF placement. Patient desires SNF.   Lower extremitiy US - partially occlusive DVT in LLE. Started on lovenox and switched to apixiban.     Hypercalcemia  -- Elevated Ca of 14.4 on CMP  -- Rreated with IVF's, calcitonin, zometa at OSH  -- Patient is awake but confused following simple commands.   -- ionized calcium within normal limits after IV fluids, calcitonin, zoledronic acid  -Ca 7.6      UTI (urinary tract infection)  -- Urine cx growing E coli at OSH    Plan  -- IV CTX (today is day 3)  -- repeat urinalysis normal  --Completed course of levaquin on 12/13.      Pneumonia  Community Acquired Pneumonia  -- Chest Xray: left basilar atelectasis with developing consolidation    Plan:  -- oral Levaquin every other day starting 12/11/2020.  -- Oxygen prn for spo2 <90  -- Duo-nebs q6 hours p.r.n.  -- Acapella and Incentive Spirometer  -- Completed course of levaquin on 12/13.      Multiple myeloma  -- Right shoulder xray: no evidence of fx. Small cortical lucencies suspicious for multiple myeloma.   -- Serum and urine protein electrophoresis collected but are send out labs.   [place] free light chain:  264.28  Lambda free light chain 0.38  Kappa lambda ratio 695.50 (elevated)      Plan:  - bone marrow biopsy was performed 12/11. Awaiting results.   -.  Patient wants to establish her care here at [facility].  We will schedule outpatient follow-up once she is ready for discharge               Mango Black MD  Hematology/Oncology  [facility]

## 2020-12-14 NOTE — ASSESSMENT & PLAN NOTE
Patient is complaining of weakness, unable to walk on her own.  Prior to this acute event., she was fully independent.  PT/OT w  Disposition pending thier recommendations: will attempt SNF placement. Patient desires SNF.   Lower extremitiy US - partially occlusive DVT in LLE. Started on lovenox and switched to apixiban.

## 2020-12-14 NOTE — DISCHARGE SUMMARY
[facility]  Hematology/Oncology  Discharge Summary      Patient Name: Mickie Pan  MRN: [id number]  Admission Date: 12/9/2020  Hospital Length of Stay: 5 days  Discharge Date and Time:  12/14/2020 11:24 AM  Attending Physician: Wade Winslow MD   Discharging Provider: Mango Black MD  Primary Care Provider: Primary Doctor No    HPI: Mickie Pan is an 84-year-old female with a PMHx of overactive bladder who presents to [facility] as a transfer from [facility] in [place], [place] for further evaluation for suspected multiple myeloma as well as altered mental status + UTI/Pneumonia.     Pt has been having intermittent right neck pain for the past 6 months; pain worsens in the morning upon awakening & exacerbates with direct contact. Pt also having bilateral arm pain.    * No surgery found *     Hospital Course: Admit to medicine Oncology on 12/09/2020.  Collateral information obtained from son reports patient has had altered mental status for the last week, reports prior to current episode patient was able to perform all activities of daily living on her own.  CT chest abdomen pelvis:  Small pleural effusion, multilevel lucencies in vertebrae, most pronounced in T7, concerning for metastatic disease.  Kappa free light chain:  264.28, lambda free light chain:  0.38, kappa/lambda the 695.50    12/11/2020 today patient is more alert and oriented x3.  We have discussed the findings with her concerning for multiple myeloma.  She wanted to establish her care here.  Bone marrow biopsy on 12/11. Started on lovenox and switched to apixiban for DVT treatment.       Consults:   Physical Exam   Constitutional: She is oriented to person, place, and time and well-developed, well-nourished, and in no distress.   HENT:   Head: Normocephalic and atraumatic.   Eyes: EOM are normal.   Neck: No JVD present.   Cardiovascular: Regular rhythm.   Pulmonary/Chest: Effort normal and breath sounds normal.    Abdominal: Bowel sounds are normal.   Musculoskeletal: Normal range of motion.         General: No tenderness or edema.   Neurological: She is alert and oriented to person, place, and time. No cranial nerve deficit. GCS score is 15.   Skin: Skin is warm and dry. No rash noted. No erythema.   Psychiatric: Mood, affect and judgment normal.       Significant Diagnostic Studies: Labs:   CMP   Recent Labs   Lab 12/13/20  0538 12/14/20  0455    145   K 2.9* 3.9    111*   CO2 27 23   GLU 86 101   BUN 18 17   CREATININE 0.8 1.1   CALCIUM 7.6* 7.0*   PROT 5.2* 5.2*   ALBUMIN 2.7* 2.5*   BILITOT 0.5 0.5   ALKPHOS 61 60   AST 25 25   ALT 17 19   ANIONGAP 8 11   ESTGFRAFRICA >60.0 53.3*   EGFRNONAA >60.0 46.2*    and CBC   Recent Labs   Lab 12/13/20  0538 12/14/20  0455   WBC 10.26 10.52   HGB 10.1* 10.0*   HCT 31.5* 31.6*    341       Pending Diagnostic Studies:     Procedure Component Value Units Date/Time    Heme Disorders DNA/RNA Hold, Bone Marrow [[id number]] Collected: 12/11/20 1554    Order Status: Sent Lab Status: In process Updated: 12/11/20 1702    Specimen: Bone Marrow     Specimen to Pathology, Bone Marrow Aspiration/Biopsy [[id number]] Collected: 12/11/20 1424    Order Status: Sent Lab Status: In process Updated: 12/11/20 1618        Final Active Diagnoses:    Diagnosis Date Noted POA    PRINCIPAL PROBLEM:  Encephalopathy, metabolic [G93.41] 12/09/2020 Unknown    Physical deconditioning [R53.81] 12/11/2020 Yes    Multiple myeloma [C90.00] 12/09/2020 Yes    Pneumonia [J18.9] 12/09/2020 Unknown    UTI (urinary tract infection) [N39.0] 12/09/2020 Unknown    Hypercalcemia [E83.52] 12/09/2020 Unknown      Problems Resolved During this Admission:      Discharged Condition: stable    Disposition: Skilled Nursing Facility    Follow Up:    Patient Instructions:   No discharge procedures on file.  Medications:  Reconciled Home Medications:      Medication List      START taking these medications     ELIQUIS DVT-PE TREAT 30D START 5 mg (74 tabs) Dspk  Generic drug: apixaban  For the first 7 days take two 5 mg tablets twice daily.  After 7 days take one 5 mg tablet twice daily.        STOP taking these medications    ketorolac 10 mg tablet  Commonly known as: TORADOL     methocarbamoL 500 MG Tab  Commonly known as: ROBAXIN            Mango Black MD  Hematology/Oncology  [facility]

## 2020-12-14 NOTE — PLAN OF CARE
POC reviewed with patient; understanding verbalized. MARISOL. Pt maintaining sats on room air. Pt voiding via purewick. 2 bms this shift. Pt with nonskid footwear on, bed in lowest position, and locked with bed rails up x2. Pt instructed to call prior to getting OOB. Pt has call light and personal items within reach. VSS and afebrile this shift. All questions and concerns addressed at this time. Will continue to monitor.

## 2020-12-14 NOTE — SUBJECTIVE & OBJECTIVE
Interval History: NAEON. Awaiting [facility] authorization.    Oncology Treatment Plan:   [No treatment plan]    Medications:  Continuous Infusions:  Scheduled Meds:   aspirin  81 mg Oral Daily    enoxaparin  100 mg Subcutaneous Q24H    mupirocin   Nasal BID     PRN Meds:albuterol-ipratropium, melatonin, ondansetron, sodium chloride 0.9%     Review of Systems   Constitutional: Negative for chills and fever.   HENT: Negative for sore throat.    Respiratory: Negative for cough and shortness of breath.    Cardiovascular: Negative for chest pain and leg swelling.   Gastrointestinal: Negative for abdominal pain.   Genitourinary: Negative for dysuria.   Musculoskeletal: Positive for joint swelling. Negative for back pain and myalgias.   Psychiatric/Behavioral: Negative for confusion.     Objective:     Vital Signs (Most Recent):  Temp: 99.2 °F (37.3 °C) (12/14/20 1116)  Pulse: 79 (12/14/20 1116)  Resp: 16 (12/14/20 1116)  BP: (!) 153/67 (12/14/20 1116)  SpO2: (!) 94 % (12/14/20 1116) Vital Signs (24h Range):  Temp:  [98 °F (36.7 °C)-99.2 °F (37.3 °C)] 99.2 °F (37.3 °C)  Pulse:  [76-87] 79  Resp:  [16-19] 16  SpO2:  [93 %-97 %] 94 %  BP: (130-153)/(61-72) 153/67     Weight: 67.7 kg (149 lb 4.7 oz)  Body mass index is 29.16 kg/m².  Body surface area is 1.69 meters squared.      Intake/Output Summary (Last 24 hours) at 12/14/2020 1450  Last data filed at 12/14/2020 1300  Gross per 24 hour   Intake 480 ml   Output 500 ml   Net -20 ml       Physical Exam  HENT:      Head: Normocephalic and atraumatic.      Mouth/Throat:      Mouth: Mucous membranes are moist.   Eyes:      Extraocular Movements: Extraocular movements intact.   Cardiovascular:      Rate and Rhythm: Normal rate and regular rhythm.      Pulses: Normal pulses.      Heart sounds: Normal heart sounds.   Pulmonary:      Effort: Pulmonary effort is normal.   Abdominal:      General: Abdomen is flat.      Palpations: Abdomen is soft.   Musculoskeletal:      Left wrist: She  exhibits swelling.      Right lower leg: No edema.      Left lower leg: No edema.   Skin:     Capillary Refill: Capillary refill takes less than 2 seconds.   Neurological:      General: No focal deficit present.      Mental Status: She is alert and oriented to person, place, and time. Mental status is at baseline.         Significant Labs:   CBC:   Recent Labs   Lab 12/13/20 0538 12/14/20  0455   WBC 10.26 10.52   HGB 10.1* 10.0*   HCT 31.5* 31.6*    341   , CMP:   Recent Labs   Lab 12/13/20 0538 12/14/20  0455    145   K 2.9* 3.9    111*   CO2 27 23   GLU 86 101   BUN 18 17   CREATININE 0.8 1.1   CALCIUM 7.6* 7.0*   PROT 5.2* 5.2*   ALBUMIN 2.7* 2.5*   BILITOT 0.5 0.5   ALKPHOS 61 60   AST 25 25   ALT 17 19   ANIONGAP 8 11   EGFRNONAA >60.0 46.2*    and Coagulation: No results for input(s): PT, INR, APTT in the last 48 hours.    Diagnostic Results:  I have reviewed all pertinent imaging results/findings within the past 24 hours.

## 2020-12-14 NOTE — PT/OT/SLP PROGRESS
Physical Therapy Treatment    Patient Name:  Mickie Pan   MRN:  [id number]    Recommendations:     Discharge Recommendations:  nursing facility, skilled   Discharge Equipment Recommendations: bedside commode, walker, rolling   Barriers to discharge: None    Assessment:     Mickie Pan is a 85 y.o. female admitted with a medical diagnosis of Encephalopathy, metabolic.  She presents with the following impairments/functional limitations:  weakness, impaired endurance, impaired self care skills, impaired functional mobilty, gait instability Pt. cooperative and tolerated treatment well. Pt. progressing with mobility.    Rehab Prognosis: Good; patient would benefit from acute skilled PT services to address these deficits and reach maximum level of function.    Recent Surgery: * No surgery found *      Plan:     During this hospitalization, patient to be seen 4 x/week to address the identified rehab impairments via gait training, therapeutic activities, therapeutic exercises and progress toward the following goals:    · Plan of Care Expires:  01/10/21    Subjective     Chief Complaint: weakness  Patient/Family Comments/goals: to get stronger  Pain/Comfort:  · Pain Rating 1: 0/10  · Pain Rating Post-Intervention 1: 0/10      Objective:     Communicated with nursing prior to session.  Patient found supine with peripheral IV upon PT entry to room.     General Precautions: Standard, fall   Orthopedic Precautions:N/A   Braces: N/A     Functional Mobility:  · Bed Mobility:     · Rolling Left:  minimum assistance  · Scooting: minimum assistance  · Supine to Sit: minimum assistance  · Sit to Supine: minimum assistance  · Transfers:     · Sit to Stand:  contact guard assistance and minimum assistance with rolling walker  · Gait: 150' with RW and CGA-Min A for balance/safety. Pt. amb. with slightly unsteady gait that improved as she walked.  · Balance: fair-      AM-PAC 6 CLICK MOBILITY  Turning over in bed  (including adjusting bedclothes, sheets and blankets)?: 3  Sitting down on and standing up from a chair with arms (e.g., wheelchair, bedside commode, etc.): 3  Moving from lying on back to sitting on the side of the bed?: 3  Moving to and from a bed to a chair (including a wheelchair)?: 3  Need to walk in hospital room?: 3  Climbing 3-5 steps with a railing?: 2  Basic Mobility Total Score: 17       Therapeutic Activities and Exercises:   Discussed pt.'s progress, goals, and POC.    Patient left supine with all lines intact and call button in reach..    GOALS:   Multidisciplinary Problems     Physical Therapy Goals        Problem: Physical Therapy Goal    Goal Priority Disciplines Outcome Goal Variances Interventions   Physical Therapy Goal     PT, PT/OT Ongoing, Progressing     Description: Goals to be met by: 2020     Patient will increase functional independence with mobility by performin. Supine to sit with Set-up [place]  2. Sit to supine with Set-up [place]  3. Sit to stand transfer with Supervision  4. Bed to chair transfer with Supervision using LRAD  5. Gait  x 150 feet with Supervision using LRAD.   6. Lower extremity exercise program x15 reps per handout, with supervision  7. Ascend/descend 5 stairs with handrail and Supervision                     Time Tracking:     PT Received On: 20  PT Start Time: 1600     PT Stop Time: 1615  PT Total Time (min): 15 min     Billable Minutes: Gait Training 15    Treatment Type: Treatment  PT/PTA: PT           Lb Purdy, PT  2020

## 2020-12-14 NOTE — PLAN OF CARE
[facility]     Department of Hospital Medicine     [address] [place] [address]     [phone] after hours  [phone] fax       NURSING HOME ORDERS    12/14/2020    Admit to Nursing Home:     Skilled Bed                                                 Diagnoses:  Active Hospital Problems    Diagnosis  POA    *Encephalopathy, metabolic [G93.41]  Unknown    Physical deconditioning [R53.81]  Yes    Multiple myeloma [C90.00]  Yes    Pneumonia [J18.9]  Unknown    UTI (urinary tract infection) [N39.0]  Unknown    Hypercalcemia [E83.52]  Unknown      Resolved Hospital Problems   No resolved problems to display.       Patient is homebound due to:  Encephalopathy, metabolic    Allergies:Review of patient's allergies indicates:  No Known Allergies    Vitals:      Routine, once monthly    Diet: Adult regular diet, 2000 calories  Acitivities:        - Up in a chair each morning as tolerated   - Scheduled walks once each shift (every 8 hours)   - May ambulate independently    LABS:  Per facility protocol   CMP, CBC each month for 3 months   PT-INR each week for 1 month then monthly     Nursing Precautions:   - Aspiration precautions:             -  Upright 90 degrees befor during and after meals    - Fall precautions per nursing home protocol   - Seizure precaution per nursing home protocol        CONSULTS:      Physical Therapy to evaluate and treat     Occupational Therapy to evaluate and treat      Medications: Discontinue all previous medication orders, if any. See new list below.       Mickie Pan [facility] Medication Instructions JOSE DE JESUS:[id number]    Printed on:12/14/20 1002   Medication Information                      apixaban (ELIQUIS) 5 mg (74 tabs) DsPk  For the first 7 days take two 5 mg tablets twice daily.  After 7 days take one 5 mg tablet twice daily.                   PPD information:  Lot- [id number] EXP 4/7/22 - [facility]  PASTEUR      _________________________________  Mango Black MD  12/14/2020

## 2020-12-14 NOTE — ASSESSMENT & PLAN NOTE
-- Right shoulder xray: no evidence of fx. Small cortical lucencies suspicious for multiple myeloma.   -- Serum and urine protein electrophoresis collected but are send out labs.   [place] free light chain:  264.28  Lambda free light chain 0.38  Kappa lambda ratio 695.50 (elevated)      Plan:  - bone marrow biopsy was performed 12/11. Awaiting results.   -.  Patient wants to establish her care here at [facility].  We will schedule outpatient follow-up once she is ready for discharge

## 2020-12-15 VITALS
HEART RATE: 83 BPM | HEIGHT: 60 IN | OXYGEN SATURATION: 94 % | WEIGHT: 149.31 LBS | BODY MASS INDEX: 29.32 KG/M2 | SYSTOLIC BLOOD PRESSURE: 155 MMHG | TEMPERATURE: 99 F | RESPIRATION RATE: 18 BRPM | DIASTOLIC BLOOD PRESSURE: 66 MMHG

## 2020-12-15 LAB
ALBUMIN SERPL BCP-MCNC: 2.9 G/DL (ref 3.5–5.2)
ALP SERPL-CCNC: 66 U/L (ref 55–135)
ALT SERPL W/O P-5'-P-CCNC: 19 U/L (ref 10–44)
ANION GAP SERPL CALC-SCNC: 8 MMOL/L (ref 8–16)
AST SERPL-CCNC: 24 U/L (ref 10–40)
BASOPHILS # BLD AUTO: 0.07 K/UL (ref 0–0.2)
BASOPHILS NFR BLD: 0.7 % (ref 0–1.9)
BILIRUB SERPL-MCNC: 0.5 MG/DL (ref 0.1–1)
BUN SERPL-MCNC: 11 MG/DL (ref 8–23)
CALCIUM SERPL-MCNC: 7 MG/DL (ref 8.7–10.5)
CHLORIDE SERPL-SCNC: 109 MMOL/L (ref 95–110)
CO2 SERPL-SCNC: 25 MMOL/L (ref 23–29)
CREAT SERPL-MCNC: 0.8 MG/DL (ref 0.5–1.4)
DIFFERENTIAL METHOD: ABNORMAL
EOSINOPHIL # BLD AUTO: 0.4 K/UL (ref 0–0.5)
EOSINOPHIL NFR BLD: 3.6 % (ref 0–8)
ERYTHROCYTE [DISTWIDTH] IN BLOOD BY AUTOMATED COUNT: 13.6 % (ref 11.5–14.5)
EST. GFR  (AFRICAN AMERICAN): >60 ML/MIN/1.73 M^2
EST. GFR  (NON AFRICAN AMERICAN): >60 ML/MIN/1.73 M^2
GLUCOSE SERPL-MCNC: 90 MG/DL (ref 70–110)
HCT VFR BLD AUTO: 32.1 % (ref 37–48.5)
HGB BLD-MCNC: 10.3 G/DL (ref 12–16)
IMM GRANULOCYTES # BLD AUTO: 0.23 K/UL (ref 0–0.04)
IMM GRANULOCYTES NFR BLD AUTO: 2.1 % (ref 0–0.5)
LYMPHOCYTES # BLD AUTO: 1.6 K/UL (ref 1–4.8)
LYMPHOCYTES NFR BLD: 15.3 % (ref 18–48)
MAGNESIUM SERPL-MCNC: 1.8 MG/DL (ref 1.6–2.6)
MCH RBC QN AUTO: 31.5 PG (ref 27–31)
MCHC RBC AUTO-ENTMCNC: 32.1 G/DL (ref 32–36)
MCV RBC AUTO: 98 FL (ref 82–98)
MONOCYTES # BLD AUTO: 0.9 K/UL (ref 0.3–1)
MONOCYTES NFR BLD: 8.7 % (ref 4–15)
NEUTROPHILS # BLD AUTO: 7.5 K/UL (ref 1.8–7.7)
NEUTROPHILS NFR BLD: 69.6 % (ref 38–73)
NRBC BLD-RTO: 0 /100 WBC
PHOSPHATE SERPL-MCNC: 2.1 MG/DL (ref 2.7–4.5)
PHOSPHATE SERPL-MCNC: 2.1 MG/DL (ref 2.7–4.5)
PLATELET # BLD AUTO: 365 K/UL (ref 150–350)
PMV BLD AUTO: 10.5 FL (ref 9.2–12.9)
POTASSIUM SERPL-SCNC: 3.6 MMOL/L (ref 3.5–5.1)
PROT SERPL-MCNC: 5.5 G/DL (ref 6–8.4)
RBC # BLD AUTO: 3.27 M/UL (ref 4–5.4)
SODIUM SERPL-SCNC: 142 MMOL/L (ref 136–145)
WBC # BLD AUTO: 10.73 K/UL (ref 3.9–12.7)

## 2020-12-15 PROCEDURE — 86580 TB INTRADERMAL TEST: CPT | Performed by: STUDENT IN AN ORGANIZED HEALTH CARE EDUCATION/TRAINING PROGRAM

## 2020-12-15 PROCEDURE — 94664 DEMO&/EVAL PT USE INHALER: CPT

## 2020-12-15 PROCEDURE — 99238 HOSP IP/OBS DSCHRG MGMT 30/<: CPT | Mod: GC,,, | Performed by: INTERNAL MEDICINE

## 2020-12-15 PROCEDURE — 25000003 PHARM REV CODE 250: Performed by: STUDENT IN AN ORGANIZED HEALTH CARE EDUCATION/TRAINING PROGRAM

## 2020-12-15 PROCEDURE — 80053 COMPREHEN METABOLIC PANEL: CPT

## 2020-12-15 PROCEDURE — 94761 N-INVAS EAR/PLS OXIMETRY MLT: CPT

## 2020-12-15 PROCEDURE — 85025 COMPLETE CBC W/AUTO DIFF WBC: CPT

## 2020-12-15 PROCEDURE — 83735 ASSAY OF MAGNESIUM: CPT

## 2020-12-15 PROCEDURE — 30200315 PPD INTRADERMAL TEST REV CODE 302: Performed by: STUDENT IN AN ORGANIZED HEALTH CARE EDUCATION/TRAINING PROGRAM

## 2020-12-15 PROCEDURE — 36415 COLL VENOUS BLD VENIPUNCTURE: CPT

## 2020-12-15 PROCEDURE — 99238 PR HOSPITAL DISCHARGE DAY,<30 MIN: ICD-10-PCS | Mod: GC,,, | Performed by: INTERNAL MEDICINE

## 2020-12-15 PROCEDURE — 63600175 PHARM REV CODE 636 W HCPCS: Performed by: STUDENT IN AN ORGANIZED HEALTH CARE EDUCATION/TRAINING PROGRAM

## 2020-12-15 PROCEDURE — 99900035 HC TECH TIME PER 15 MIN (STAT)

## 2020-12-15 PROCEDURE — 84100 ASSAY OF PHOSPHORUS: CPT

## 2020-12-15 RX ADMIN — ASPIRIN 81 MG: 81 TABLET, COATED ORAL at 08:12

## 2020-12-15 RX ADMIN — ENOXAPARIN SODIUM 100 MG: 100 INJECTION SUBCUTANEOUS at 04:12

## 2020-12-15 RX ADMIN — TUBERCULIN PURIFIED PROTEIN DERIVATIVE 5 UNITS: 5 INJECTION, SOLUTION INTRADERMAL at 02:12

## 2020-12-15 NOTE — ASSESSMENT & PLAN NOTE
-- Right shoulder xray: no evidence of fx. Small cortical lucencies suspicious for multiple myeloma.   -- Serum and urine protein electrophoresis collected but are send out labs.   [place] free light chain:  264.28  Lambda free light chain 0.38  Kappa lambda ratio 695.50 (elevated)      Plan:  - bone marrow biopsy was performed 12/11. Awaiting results.   -.  Patient wants to establish her care here at [facility].  We will schedule outpatient follow-up once she is ready for discharge

## 2020-12-15 NOTE — PLAN OF CARE
Plan of care reviewed with the patient at the beginning of shift. The patient is alert and oriented. GCS 15. Denying complaints at this time. NAEON. Pt remained free from falls and injuries throughout shift. VSS. Bed in low locked position. Call bell and personal items within reach. Will continue to monitor.

## 2020-12-15 NOTE — DISCHARGE SUMMARY
[facility]  Hematology/Oncology  Discharge Summary      Patient Name: Mickie Pan  MRN: [id number]  Admission Date: 12/9/2020  Hospital Length of Stay: 6 days  Discharge Date and Time:  12/15/2020 10:52 AM  Attending Physician: Wade Winslow MD   Discharging Provider: Mango Black MD  Primary Care Provider: Primary Doctor No    HPI: Mickie Pan is an 84-year-old female with a PMHx of overactive bladder who presents to [facility] as a transfer from [facility] in [place], [place] for further evaluation for suspected multiple myeloma as well as altered mental status + UTI/Pneumonia.     Pt has been having intermittent right neck pain for the past 6 months; pain worsens in the morning upon awakening & exacerbates with direct contact. Pt also having bilateral arm pain.    * No surgery found *     Hospital Course: Admit to medicine Oncology on 12/09/2020.  Collateral information obtained from son reports patient has had altered mental status for the last week, reports prior to current episode patient was able to perform all activities of daily living on her own.  CT chest abdomen pelvis:  Small pleural effusion, multilevel lucencies in vertebrae, most pronounced in T7, concerning for metastatic disease.  Kappa free light chain:  264.28, lambda free light chain:  0.38, kappa/lambda the 695.50    12/11/2020 today patient is more alert and oriented x3.  We have discussed the findings with her concerning for multiple myeloma.  She wanted to establish her care here.  Bone marrow biopsy on 12/11. Started on lovenox and switched to apixiban for DVT treatment.  Discharged to SNF on 12/15.       Consults:      Physical Exam   Constitutional: She is oriented to person, place, and time and well-developed, well-nourished, and in no distress.   HENT:   Head: Normocephalic and atraumatic.   Eyes: Conjunctivae and EOM are normal. No scleral icterus.   Neck: No JVD present. No tracheal deviation  present.   Cardiovascular: Normal rate and regular rhythm.   Pulmonary/Chest: Effort normal and breath sounds normal.   Abdominal: Soft. Bowel sounds are normal.   Musculoskeletal: Normal range of motion.         General: No tenderness or edema.   Neurological: She is alert and oriented to person, place, and time. GCS score is 15.   Skin: Skin is warm and dry. No rash noted. She is not diaphoretic. No erythema.       Significant Diagnostic Studies: Labs:   CMP   Recent Labs   Lab 12/14/20 0455 12/15/20  0544    142   K 3.9 3.6   * 109   CO2 23 25    90   BUN 17 11   CREATININE 1.1 0.8   CALCIUM 7.0* 7.0*   PROT 5.2* 5.5*   ALBUMIN 2.5* 2.9*   BILITOT 0.5 0.5   ALKPHOS 60 66   AST 25 24   ALT 19 19   ANIONGAP 11 8   ESTGFRAFRICA 53.3* >60.0   EGFRNONAA 46.2* >60.0    and CBC   Recent Labs   Lab 12/14/20 0455 12/15/20  0544   WBC 10.52 10.73   HGB 10.0* 10.3*   HCT 31.6* 32.1*    365*       Pending Diagnostic Studies:     Procedure Component Value Units Date/Time    Heme Disorders DNA/RNA Hold, Bone Marrow [[id number]] Collected: 12/11/20 8233    Order Status: Sent Lab Status: In process Updated: 12/11/20 1702    Specimen: Bone Marrow         Final Active Diagnoses:    Diagnosis Date Noted POA    PRINCIPAL PROBLEM:  Encephalopathy, metabolic [G93.41] 12/09/2020 Unknown    Physical deconditioning [R53.81] 12/11/2020 Yes    Multiple myeloma [C90.00] 12/09/2020 Yes    Pneumonia [J18.9] 12/09/2020 Unknown    UTI (urinary tract infection) [N39.0] 12/09/2020 Unknown    Hypercalcemia [E83.52] 12/09/2020 Unknown      Problems Resolved During this Admission:      Discharged Condition: stable    Disposition: Skilled Nursing Facility    Follow Up:    Patient Instructions:   No discharge procedures on file.  Medications:  Reconciled Home Medications:      Medication List      START taking these medications    ELIQUIS DVT-PE TREAT 30D START 5 mg (74 tabs) Dspk  Generic drug: apixaban  For the first  7 days take two 5 mg tablets twice daily.  After 7 days take one 5 mg tablet twice daily.        STOP taking these medications    ketorolac 10 mg tablet  Commonly known as: TORADOL     methocarbamoL 500 MG Tab  Commonly known as: ROBAXIN          PPD information:  Lot- [id number] EXP 4/7/22 - R SANOFI PASTEUR Junaid T Yasin, MD  Hematology/Oncology  [facility]

## 2020-12-15 NOTE — PROGRESS NOTES
Road Test  Oxygen-Patient tolerating room air, no distress.  Ambulation-Patient up with assistance  Devices-Patient going to skilled nursing with no devices.  Tolerating-Regular diet.  Elimination-Patient voiding without difficulty.  Self Care-Performs self care with some assistance  Teaching-Verbal and written discharge teaching given.     Patient tolerates room air, ambulates with assistance, regular diet, voiding without difficulty, and is going home with no devices. Peripheral IV removed, catheter intact, dressed with dry gauze and coban. No bleeding present. Patient going to skilled nursing. Discharge paperwork discussed. Medications reviewed. Patient has all belongings and has no questions at this time.

## 2020-12-15 NOTE — PT/OT/SLP PROGRESS
Physical Therapy      Patient Name:  Mickie Pan   MRN:  [id number]    Patient not seen today secondary to (pt. with hospital discharge orders signed and about to go to SNF). Will follow-up tomorrow, if not discharged.    Lb Purdy, PT   12/15/2020

## 2020-12-15 NOTE — PROGRESS NOTES
Pt is ready for d/c.  Elena ([facility] ([phone]) Fax ([phone]) called Sw this morning to inform pt's insurance authorized SNF treatment.  OSMEL communicated with med/onc team to inform them pt would need TB test and chest xray.  SW along with provider completed all necessary paperwork.  Pt had a chest xray on the 12/9/20 and Elena said facility would accept it.  All relevant information was faxed to facility.  Elena will call to provide number where nurse can give report.  Osmel will continue to follow.

## 2020-12-15 NOTE — PROGRESS NOTES
PPD done by Karishma Giang RN on 12/15/2020 at 1424 on patients right arm. Site is circled with marker.

## 2020-12-15 NOTE — PROGRESS NOTES
Elena ([facility]) called Lalo and provided number for ERICH Schwarz to call and give report.  [phone].  LALO confirmed facility address--[address] [place] [address]--, [place]  [address].   LALO provided Karishma with report number and arranged wheelchair van transport to this address.  Pt informed at bedside and pt's son informed via telephone.  LALO remains available.

## 2020-12-15 NOTE — PLAN OF CARE
Patient AAOx4. No complaints this shift. Awaiting BMB results. 1 BM this shift. Bed in low locked position, call light and personal items within reach. Instructed to call if needed. Plan to discharge to skilled nursing facility tomorrow. Pending insurance authorization.

## 2020-12-16 LAB
DNA/RNA EXTRACT AND HOLD RESULT: NORMAL
DNA/RNA EXTRACTION: NORMAL
EXHR SPECIMEN TYPE: NORMAL

## 2020-12-16 NOTE — PROGRESS NOTES
Patient AAOx4. No complaints this shift. Waiting for mels transportation to  patient for discharge to SNF. Bed in low locked position, call light and personal items in reach. Instructed to call if needed.

## 2020-12-16 NOTE — PROGRESS NOTES
Call received thru on call from pts. Nurse (Tamica) inquiring about transport for pt. Going to inpatient rehab in [place], [place]. Called [facility] transport ([phone]) and spoke with Kathy, who informs that transport set up with [facility]. [facility]. Still able to transfer this evening and will inform if OK to dispatch [facility]. [facility] Nai (nurse) who informs that they are still able to accept pt. This evening. Called [facility] transport back and spoke with dispatcher who states that [facility] has arrived and will transport patient. Informed pts. Nurse of the above, also informed nurse to have [facility]  call facility when 10-15 minutes away so that facility can let patient in. Nurse also confirms that pt. Has received all medication for the evening. No other needs identified.

## 2020-12-21 LAB
GENETICIST REVIEW: NORMAL
PLASMA CELL PROLIF RELEASED BY: NORMAL
PLASMA CELL PROLIF RESULT SUMMARY: NORMAL
PLASMA CELL PROLIF RESULT TABLE: NORMAL
REASON FOR REFERRAL, PLASMA CELL PROLIF (PCPD), FISH: NORMAL
REF LAB TEST METHOD: NORMAL
RESULTS, PLASMA CELL PROLIF (PCPD), FISH: NORMAL
SERVICE CMNT-IMP: NORMAL
SERVICE CMNT-IMP: NORMAL
SPECIMEN SOURCE: NORMAL
SPECIMEN, PLASMA CELL PROLIF (PCPD), FISH: NORMAL

## 2020-12-22 LAB
COMMENT: NORMAL
FINAL PATHOLOGIC DIAGNOSIS: NORMAL
GROSS: NORMAL
MICROSCOPIC EXAM: NORMAL
SUPPLEMENTAL DIAGNOSIS: NORMAL

## 2021-01-12 ENCOUNTER — LAB VISIT (OUTPATIENT)
Dept: LAB | Facility: HOSPITAL | Age: 86
End: 2021-01-12
Attending: STUDENT IN AN ORGANIZED HEALTH CARE EDUCATION/TRAINING PROGRAM
Payer: MEDICARE

## 2021-01-12 ENCOUNTER — OFFICE VISIT (OUTPATIENT)
Dept: HEMATOLOGY/ONCOLOGY | Facility: CLINIC | Age: 86
End: 2021-01-12
Payer: MEDICARE

## 2021-01-12 VITALS
DIASTOLIC BLOOD PRESSURE: 80 MMHG | HEIGHT: 60 IN | SYSTOLIC BLOOD PRESSURE: 138 MMHG | WEIGHT: 139.88 LBS | OXYGEN SATURATION: 95 % | HEART RATE: 96 BPM | BODY MASS INDEX: 27.46 KG/M2 | RESPIRATION RATE: 16 BRPM

## 2021-01-12 DIAGNOSIS — C90.00 MULTIPLE MYELOMA NOT HAVING ACHIEVED REMISSION: ICD-10-CM

## 2021-01-12 DIAGNOSIS — I82.432 ACUTE DEEP VEIN THROMBOSIS (DVT) OF POPLITEAL VEIN OF LEFT LOWER EXTREMITY: ICD-10-CM

## 2021-01-12 DIAGNOSIS — E83.52 HYPERCALCEMIA: ICD-10-CM

## 2021-01-12 DIAGNOSIS — C90.00 MULTIPLE MYELOMA NOT HAVING ACHIEVED REMISSION: Primary | ICD-10-CM

## 2021-01-12 LAB
ALBUMIN SERPL BCP-MCNC: 4 G/DL (ref 3.5–5.2)
ALP SERPL-CCNC: 91 U/L (ref 55–135)
ALT SERPL W/O P-5'-P-CCNC: 10 U/L (ref 10–44)
ANION GAP SERPL CALC-SCNC: 13 MMOL/L (ref 8–16)
AST SERPL-CCNC: 18 U/L (ref 10–40)
BASOPHILS # BLD AUTO: 0.04 K/UL (ref 0–0.2)
BASOPHILS NFR BLD: 0.4 % (ref 0–1.9)
BILIRUB SERPL-MCNC: 0.5 MG/DL (ref 0.1–1)
BUN SERPL-MCNC: 13 MG/DL (ref 8–23)
CALCIUM SERPL-MCNC: 9.4 MG/DL (ref 8.7–10.5)
CHLORIDE SERPL-SCNC: 107 MMOL/L (ref 95–110)
CO2 SERPL-SCNC: 26 MMOL/L (ref 23–29)
CREAT SERPL-MCNC: 1 MG/DL (ref 0.5–1.4)
DIFFERENTIAL METHOD: ABNORMAL
EOSINOPHIL # BLD AUTO: 0.1 K/UL (ref 0–0.5)
EOSINOPHIL NFR BLD: 1 % (ref 0–8)
ERYTHROCYTE [DISTWIDTH] IN BLOOD BY AUTOMATED COUNT: 14.5 % (ref 11.5–14.5)
EST. GFR  (AFRICAN AMERICAN): 59.8 ML/MIN/1.73 M^2
EST. GFR  (NON AFRICAN AMERICAN): 51.9 ML/MIN/1.73 M^2
GLUCOSE SERPL-MCNC: 95 MG/DL (ref 70–110)
HCT VFR BLD AUTO: 37.8 % (ref 37–48.5)
HGB BLD-MCNC: 11.8 G/DL (ref 12–16)
IMM GRANULOCYTES # BLD AUTO: 0.07 K/UL (ref 0–0.04)
IMM GRANULOCYTES NFR BLD AUTO: 0.7 % (ref 0–0.5)
LDH SERPL L TO P-CCNC: 243 U/L (ref 110–260)
LYMPHOCYTES # BLD AUTO: 1.8 K/UL (ref 1–4.8)
LYMPHOCYTES NFR BLD: 17.3 % (ref 18–48)
MCH RBC QN AUTO: 32 PG (ref 27–31)
MCHC RBC AUTO-ENTMCNC: 31.2 G/DL (ref 32–36)
MCV RBC AUTO: 102 FL (ref 82–98)
MONOCYTES # BLD AUTO: 0.7 K/UL (ref 0.3–1)
MONOCYTES NFR BLD: 6.8 % (ref 4–15)
NEUTROPHILS # BLD AUTO: 7.6 K/UL (ref 1.8–7.7)
NEUTROPHILS NFR BLD: 73.8 % (ref 38–73)
NRBC BLD-RTO: 0 /100 WBC
PLATELET # BLD AUTO: 454 K/UL (ref 150–350)
PMV BLD AUTO: 10.3 FL (ref 9.2–12.9)
POTASSIUM SERPL-SCNC: 4 MMOL/L (ref 3.5–5.1)
PROT SERPL-MCNC: 7 G/DL (ref 6–8.4)
RBC # BLD AUTO: 3.69 M/UL (ref 4–5.4)
SODIUM SERPL-SCNC: 146 MMOL/L (ref 136–145)
WBC # BLD AUTO: 10.23 K/UL (ref 3.9–12.7)

## 2021-01-12 PROCEDURE — 86334 IMMUNOFIX E-PHORESIS SERUM: CPT | Mod: 26,,, | Performed by: PATHOLOGY

## 2021-01-12 PROCEDURE — 3288F FALL RISK ASSESSMENT DOCD: CPT | Mod: CPTII,GC,S$GLB, | Performed by: STUDENT IN AN ORGANIZED HEALTH CARE EDUCATION/TRAINING PROGRAM

## 2021-01-12 PROCEDURE — 1159F MED LIST DOCD IN RCRD: CPT | Mod: GC,S$GLB,, | Performed by: STUDENT IN AN ORGANIZED HEALTH CARE EDUCATION/TRAINING PROGRAM

## 2021-01-12 PROCEDURE — 99999 PR PBB SHADOW E&M-EST. PATIENT-LVL III: ICD-10-PCS | Mod: PBBFAC,GC,, | Performed by: STUDENT IN AN ORGANIZED HEALTH CARE EDUCATION/TRAINING PROGRAM

## 2021-01-12 PROCEDURE — 86334 PATHOLOGIST INTERPRETATION IFE: ICD-10-PCS | Mod: 26,,, | Performed by: PATHOLOGY

## 2021-01-12 PROCEDURE — 84165 PROTEIN E-PHORESIS SERUM: CPT | Mod: 26,,, | Performed by: PATHOLOGY

## 2021-01-12 PROCEDURE — 83520 IMMUNOASSAY QUANT NOS NONAB: CPT

## 2021-01-12 PROCEDURE — 99215 PR OFFICE/OUTPT VISIT, EST, LEVL V, 40-54 MIN: ICD-10-PCS | Mod: GC,S$GLB,, | Performed by: STUDENT IN AN ORGANIZED HEALTH CARE EDUCATION/TRAINING PROGRAM

## 2021-01-12 PROCEDURE — 1101F PT FALLS ASSESS-DOCD LE1/YR: CPT | Mod: CPTII,GC,S$GLB, | Performed by: STUDENT IN AN ORGANIZED HEALTH CARE EDUCATION/TRAINING PROGRAM

## 2021-01-12 PROCEDURE — 99999 PR PBB SHADOW E&M-EST. PATIENT-LVL III: CPT | Mod: PBBFAC,GC,, | Performed by: STUDENT IN AN ORGANIZED HEALTH CARE EDUCATION/TRAINING PROGRAM

## 2021-01-12 PROCEDURE — 85025 COMPLETE CBC W/AUTO DIFF WBC: CPT

## 2021-01-12 PROCEDURE — 36415 COLL VENOUS BLD VENIPUNCTURE: CPT

## 2021-01-12 PROCEDURE — 1125F PR PAIN SEVERITY QUANTIFIED, PAIN PRESENT: ICD-10-PCS | Mod: GC,S$GLB,, | Performed by: STUDENT IN AN ORGANIZED HEALTH CARE EDUCATION/TRAINING PROGRAM

## 2021-01-12 PROCEDURE — 83615 LACTATE (LD) (LDH) ENZYME: CPT

## 2021-01-12 PROCEDURE — 1101F PR PT FALLS ASSESS DOC 0-1 FALLS W/OUT INJ PAST YR: ICD-10-PCS | Mod: CPTII,GC,S$GLB, | Performed by: STUDENT IN AN ORGANIZED HEALTH CARE EDUCATION/TRAINING PROGRAM

## 2021-01-12 PROCEDURE — 86334 IMMUNOFIX E-PHORESIS SERUM: CPT

## 2021-01-12 PROCEDURE — 80053 COMPREHEN METABOLIC PANEL: CPT

## 2021-01-12 PROCEDURE — 1159F PR MEDICATION LIST DOCUMENTED IN MEDICAL RECORD: ICD-10-PCS | Mod: GC,S$GLB,, | Performed by: STUDENT IN AN ORGANIZED HEALTH CARE EDUCATION/TRAINING PROGRAM

## 2021-01-12 PROCEDURE — 84165 PATHOLOGIST INTERPRETATION SPE: ICD-10-PCS | Mod: 26,,, | Performed by: PATHOLOGY

## 2021-01-12 PROCEDURE — 84165 PROTEIN E-PHORESIS SERUM: CPT

## 2021-01-12 PROCEDURE — 99215 OFFICE O/P EST HI 40 MIN: CPT | Mod: GC,S$GLB,, | Performed by: STUDENT IN AN ORGANIZED HEALTH CARE EDUCATION/TRAINING PROGRAM

## 2021-01-12 PROCEDURE — 1125F AMNT PAIN NOTED PAIN PRSNT: CPT | Mod: GC,S$GLB,, | Performed by: STUDENT IN AN ORGANIZED HEALTH CARE EDUCATION/TRAINING PROGRAM

## 2021-01-12 PROCEDURE — 3288F PR FALLS RISK ASSESSMENT DOCUMENTED: ICD-10-PCS | Mod: CPTII,GC,S$GLB, | Performed by: STUDENT IN AN ORGANIZED HEALTH CARE EDUCATION/TRAINING PROGRAM

## 2021-01-13 ENCOUNTER — TELEPHONE (OUTPATIENT)
Dept: HEMATOLOGY/ONCOLOGY | Facility: CLINIC | Age: 86
End: 2021-01-13

## 2021-01-13 ENCOUNTER — SPECIALTY PHARMACY (OUTPATIENT)
Dept: PHARMACY | Facility: CLINIC | Age: 86
End: 2021-01-13

## 2021-01-13 DIAGNOSIS — C90.00 MULTIPLE MYELOMA NOT HAVING ACHIEVED REMISSION: ICD-10-CM

## 2021-01-13 LAB
INTERPRETATION SERPL IFE-IMP: NORMAL
KAPPA LC SER QL IA: 283.84 MG/DL (ref 0.33–1.94)
KAPPA LC/LAMBDA SER IA: 388.8 (ref 0.26–1.65)
LAMBDA LC SER QL IA: 0.73 MG/DL (ref 0.57–2.63)
PATHOLOGIST INTERPRETATION IFE: NORMAL

## 2021-01-13 RX ORDER — LENALIDOMIDE 25 MG/1
25 CAPSULE ORAL DAILY
Qty: 14 CAPSULE | Refills: 0 | Status: SHIPPED | OUTPATIENT
Start: 2021-01-13 | End: 2022-06-10

## 2021-01-13 RX ORDER — LENALIDOMIDE 25 MG/1
25 CAPSULE ORAL DAILY
Qty: 14 CAPSULE | Refills: 5 | Status: SHIPPED | OUTPATIENT
Start: 2021-01-13 | End: 2021-01-13 | Stop reason: SDUPTHER

## 2021-01-14 LAB
ALBUMIN SERPL ELPH-MCNC: 3.85 G/DL (ref 3.35–5.55)
ALPHA1 GLOB SERPL ELPH-MCNC: 0.4 G/DL (ref 0.17–0.41)
ALPHA2 GLOB SERPL ELPH-MCNC: 1.12 G/DL (ref 0.43–0.99)
B-GLOBULIN SERPL ELPH-MCNC: 0.67 G/DL (ref 0.5–1.1)
GAMMA GLOB SERPL ELPH-MCNC: 0.57 G/DL (ref 0.67–1.58)
PROT SERPL-MCNC: 6.6 G/DL (ref 6–8.4)

## 2021-01-15 DIAGNOSIS — K59.00 CONSTIPATION, UNSPECIFIED CONSTIPATION TYPE: Primary | ICD-10-CM

## 2021-01-15 DIAGNOSIS — K59.00 CONSTIPATION, UNSPECIFIED CONSTIPATION TYPE: ICD-10-CM

## 2021-01-15 LAB — PATHOLOGIST INTERPRETATION SPE: NORMAL

## 2021-01-15 RX ORDER — SENNOSIDES 8.6 MG/1
1 TABLET ORAL 2 TIMES DAILY
Qty: 60 TABLET | Refills: 0 | Status: SHIPPED | OUTPATIENT
Start: 2021-01-15 | End: 2021-02-14

## 2021-01-15 RX ORDER — POLYETHYLENE GLYCOL 3350 17 G/17G
17 POWDER, FOR SOLUTION ORAL DAILY
Qty: 14 EACH | Refills: 0 | Status: SHIPPED | OUTPATIENT
Start: 2021-01-15 | End: 2021-01-29

## 2021-01-15 RX ORDER — POLYETHYLENE GLYCOL 3350 17 G/17G
17 POWDER, FOR SOLUTION ORAL DAILY
Qty: 14 EACH | Refills: 0 | Status: SHIPPED | OUTPATIENT
Start: 2021-01-15 | End: 2021-01-15 | Stop reason: SDUPTHER

## 2021-01-15 RX ORDER — SENNOSIDES 8.6 MG/1
1 TABLET ORAL 2 TIMES DAILY
Qty: 60 TABLET | Refills: 0 | Status: SHIPPED | OUTPATIENT
Start: 2021-01-15 | End: 2021-01-15 | Stop reason: SDUPTHER

## 2021-01-25 ENCOUNTER — TELEPHONE (OUTPATIENT)
Dept: HEMATOLOGY/ONCOLOGY | Facility: CLINIC | Age: 86
End: 2021-01-25

## 2021-03-05 ENCOUNTER — TELEPHONE (OUTPATIENT)
Dept: HEMATOLOGY/ONCOLOGY | Facility: CLINIC | Age: 86
End: 2021-03-05

## 2021-03-09 ENCOUNTER — TELEPHONE (OUTPATIENT)
Dept: HEMATOLOGY/ONCOLOGY | Facility: CLINIC | Age: 86
End: 2021-03-09

## 2021-03-18 ENCOUNTER — TELEPHONE (OUTPATIENT)
Dept: HEMATOLOGY/ONCOLOGY | Facility: CLINIC | Age: 86
End: 2021-03-18